# Patient Record
Sex: FEMALE | Race: WHITE | NOT HISPANIC OR LATINO | URBAN - METROPOLITAN AREA
[De-identification: names, ages, dates, MRNs, and addresses within clinical notes are randomized per-mention and may not be internally consistent; named-entity substitution may affect disease eponyms.]

---

## 2017-05-30 ENCOUNTER — EMERGENCY (EMERGENCY)
Facility: HOSPITAL | Age: 47
LOS: 0 days | Discharge: HOME | End: 2017-05-30
Attending: EMERGENCY MEDICINE
Payer: COMMERCIAL

## 2017-05-30 VITALS
HEIGHT: 66 IN | WEIGHT: 169.98 LBS | DIASTOLIC BLOOD PRESSURE: 101 MMHG | HEART RATE: 86 BPM | RESPIRATION RATE: 17 BRPM | TEMPERATURE: 99 F | OXYGEN SATURATION: 99 % | SYSTOLIC BLOOD PRESSURE: 142 MMHG

## 2017-05-30 VITALS
HEART RATE: 78 BPM | OXYGEN SATURATION: 100 % | DIASTOLIC BLOOD PRESSURE: 89 MMHG | TEMPERATURE: 99 F | SYSTOLIC BLOOD PRESSURE: 140 MMHG | RESPIRATION RATE: 17 BRPM

## 2017-05-30 LAB
APPEARANCE UR: CLEAR — SIGNIFICANT CHANGE UP
BILIRUB UR-MCNC: NEGATIVE — SIGNIFICANT CHANGE UP
COLOR SPEC: YELLOW — SIGNIFICANT CHANGE UP
DIFF PNL FLD: NEGATIVE — SIGNIFICANT CHANGE UP
GLUCOSE UR QL: NEGATIVE MG/DL — SIGNIFICANT CHANGE UP
HCG UR QL: NEGATIVE — SIGNIFICANT CHANGE UP
KETONES UR-MCNC: NEGATIVE — SIGNIFICANT CHANGE UP
LEUKOCYTE ESTERASE UR-ACNC: NEGATIVE — SIGNIFICANT CHANGE UP
NITRITE UR-MCNC: NEGATIVE — SIGNIFICANT CHANGE UP
PH UR: 7 — SIGNIFICANT CHANGE UP (ref 5–8)
PROT UR-MCNC: NEGATIVE MG/DL — SIGNIFICANT CHANGE UP
SP GR SPEC: 1.01 — SIGNIFICANT CHANGE UP (ref 1.01–1.02)
UROBILINOGEN FLD QL: NEGATIVE MG/DL — SIGNIFICANT CHANGE UP

## 2017-05-30 PROCEDURE — 99283 EMERGENCY DEPT VISIT LOW MDM: CPT

## 2017-05-30 PROCEDURE — 73562 X-RAY EXAM OF KNEE 3: CPT | Mod: 26,LT

## 2017-05-30 NOTE — ED ADULT NURSE NOTE - OBJECTIVE STATEMENT
patient received, alert and oriented x4, complaining of left knee pain, stated she slipped and fell yesterday in the bathroom, denies hitting head. denies passing out. denies n/v/d.

## 2017-05-30 NOTE — ED PROVIDER NOTE - CONSTITUTIONAL, MLM
normal... Well appearing, well nourished, awake, alert, oriented to person, place, time/situation and in no apparent distress. Speaking in clear full sentences appears comfortable sitting up with her left knee extended in the stretcher

## 2017-05-30 NOTE — ED PROVIDER NOTE - OBJECTIVE STATEMENT
47 years old female walked in with her  c/o pain to left knee after she slipped and twisted it yesterday and sts pain increases to flex the left knee. Pt denies loc, headache, neck/back pain, focal/distal weakness or numbness.

## 2017-05-30 NOTE — ED PROCEDURE NOTE - CPROC ED POST PROC CARE GUIDE1
Instructed patient/caregiver to follow-up with primary care physician./Instructed patient/caregiver regarding signs and symptoms of infection./Keep the cast/splint/dressing clean and dry./Elevate the injured extremity as instructed./Verbal/written post procedure instructions were given to patient/caregiver.

## 2017-05-30 NOTE — ED PROCEDURE NOTE - NS ED PERI VASCULAR NEG
no swelling/no paresthesia/no cyanosis of extremity/fingers/toes warm to touch/capillary refill time < 2 seconds
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
## 2017-05-31 DIAGNOSIS — S83.92XA SPRAIN OF UNSPECIFIED SITE OF LEFT KNEE, INITIAL ENCOUNTER: ICD-10-CM

## 2017-05-31 DIAGNOSIS — M25.562 PAIN IN LEFT KNEE: ICD-10-CM

## 2017-05-31 LAB
CULTURE RESULTS: SIGNIFICANT CHANGE UP
SPECIMEN SOURCE: SIGNIFICANT CHANGE UP

## 2017-12-11 NOTE — ED PROVIDER NOTE - PEDAL EDEMA LATERALITY
Rob Torres-  Would you please call this mom and set up a visit for her to come talk with me? She wants to go over some outside reports.  Thanks, Dr HEARN none

## 2018-12-15 ENCOUNTER — TRANSCRIPTION ENCOUNTER (OUTPATIENT)
Age: 48
End: 2018-12-15

## 2020-07-05 ENCOUNTER — TRANSCRIPTION ENCOUNTER (OUTPATIENT)
Age: 50
End: 2020-07-05

## 2022-01-03 ENCOUNTER — TRANSCRIPTION ENCOUNTER (OUTPATIENT)
Age: 52
End: 2022-01-03

## 2022-01-08 ENCOUNTER — TRANSCRIPTION ENCOUNTER (OUTPATIENT)
Age: 52
End: 2022-01-08

## 2023-01-06 ENCOUNTER — NON-APPOINTMENT (OUTPATIENT)
Age: 53
End: 2023-01-06

## 2023-01-06 DIAGNOSIS — Z87.19 PERSONAL HISTORY OF OTHER DISEASES OF THE DIGESTIVE SYSTEM: ICD-10-CM

## 2023-01-06 DIAGNOSIS — Z78.9 OTHER SPECIFIED HEALTH STATUS: ICD-10-CM

## 2023-01-06 DIAGNOSIS — G89.4 CHRONIC PAIN SYNDROME: ICD-10-CM

## 2023-01-06 DIAGNOSIS — Z87.39 PERSONAL HISTORY OF OTHER DISEASES OF THE MUSCULOSKELETAL SYSTEM AND CONNECTIVE TISSUE: ICD-10-CM

## 2023-01-06 DIAGNOSIS — M47.817 SPONDYLOSIS W/OUT MYELOPATHY OR RADICULOPATHY, LUMBOSACRAL REGION: ICD-10-CM

## 2023-01-06 DIAGNOSIS — M25.569 PAIN IN UNSPECIFIED KNEE: ICD-10-CM

## 2023-01-06 DIAGNOSIS — G89.29 OTHER CHRONIC PAIN: ICD-10-CM

## 2023-01-06 DIAGNOSIS — Z79.891 LONG TERM (CURRENT) USE OF OPIATE ANALGESIC: ICD-10-CM

## 2023-01-06 RX ORDER — LIDOCAINE 50 MG/G
5 PATCH CUTANEOUS
Refills: 0 | Status: ACTIVE | COMMUNITY

## 2023-01-06 RX ORDER — CHLORHEXIDINE GLUCONATE 4 %
325 (65 FE) LIQUID (ML) TOPICAL 3 TIMES DAILY
Refills: 0 | Status: ACTIVE | COMMUNITY

## 2023-01-06 RX ORDER — CYCLOBENZAPRINE HYDROCHLORIDE 10 MG/1
10 TABLET, FILM COATED ORAL 3 TIMES DAILY
Refills: 0 | Status: ACTIVE | COMMUNITY

## 2023-01-20 ENCOUNTER — APPOINTMENT (OUTPATIENT)
Dept: PAIN MANAGEMENT | Facility: CLINIC | Age: 53
End: 2023-01-20
Payer: COMMERCIAL

## 2023-01-20 VITALS — BODY MASS INDEX: 28.12 KG/M2 | HEIGHT: 66 IN | WEIGHT: 175 LBS

## 2023-01-20 PROCEDURE — 99213 OFFICE O/P EST LOW 20 MIN: CPT | Mod: 95

## 2023-01-20 NOTE — HISTORY OF PRESENT ILLNESS
[Lower back] : lower back [7] : 7 [Sharp] : sharp [Constant] : constant [Household chores] : household chores [Sleep] : sleep [Rest] : rest [Standing] : standing [Walking] : walking [Part time] : Work status: part time [Home] : at home, [unfilled] , at the time of the visit. [Medical Office: ([facility])___] : at the medical office located in  [Verbal consent obtained from patient] : the patient, [unfilled] [] : no [de-identified] : LONG PERIOD OF TIME

## 2023-02-09 ENCOUNTER — NON-APPOINTMENT (OUTPATIENT)
Age: 53
End: 2023-02-09

## 2023-02-15 ENCOUNTER — APPOINTMENT (OUTPATIENT)
Dept: PAIN MANAGEMENT | Facility: CLINIC | Age: 53
End: 2023-02-15
Payer: COMMERCIAL

## 2023-02-15 VITALS — WEIGHT: 175 LBS | HEIGHT: 66 IN | BODY MASS INDEX: 28.12 KG/M2

## 2023-02-15 VITALS — WEIGHT: 175 LBS | BODY MASS INDEX: 28.12 KG/M2 | HEIGHT: 66 IN

## 2023-02-15 VITALS — HEIGHT: 66 IN | WEIGHT: 175 LBS | BODY MASS INDEX: 28.12 KG/M2

## 2023-02-15 PROCEDURE — 99213 OFFICE O/P EST LOW 20 MIN: CPT | Mod: 95

## 2023-02-15 NOTE — HISTORY OF PRESENT ILLNESS
[Lower back] : lower back [7] : 7 [Sharp] : sharp [Shooting] : shooting [Constant] : constant [Sitting] : sitting [Standing] : standing [Bending forward] : bending forward [Full time] : Work status: full time [Home] : at home, [unfilled] , at the time of the visit. [Medical Office: ([facility])___] : at the medical office located in  [Verbal consent obtained from patient] : the patient, [unfilled] [] : Post Surgical Visit: no [de-identified] : DAILY AVITIVTY

## 2023-02-15 NOTE — ASSESSMENT
[FreeTextEntry1] : Interim history\par The patient is tolerating their medications without problems. There has no new pains, injuries, or complaints and no new issues. The use of medications appears appropriate and there are no aberrant behaviors noted. Side effects to current medications are denied. Average pain score for the month is 4 out of ten. The patient's current medications are documented to the best of their ability. THe  was obtained and reviewed prior to the visit, and any discrepancies were discussed with the patient.\par Objective information\par Since the last visit there are no additional radiologic studies, labs, or pain complaints. There are no changes in the patient's physical status.\par Plan\par The patient was given refill of their medication at their current level and will return to the office as needed for follow-up. The patient is showing no aberrant behavior or evidence of diversion. Opioid contract and opioid risk assessment on chart.  reviewed and any discrepancy discussed with patent. Applicable urine toxicology reviewed and recorded in the patient's electronic record. Urine toxicology is ordered for patient per office protocol or patient's risk assessment.  Patient will follow-up in 1 month unless new issues arise the patient has returned earlier.\par

## 2023-03-13 ENCOUNTER — APPOINTMENT (OUTPATIENT)
Dept: PAIN MANAGEMENT | Facility: CLINIC | Age: 53
End: 2023-03-13
Payer: COMMERCIAL

## 2023-03-13 PROCEDURE — 99213 OFFICE O/P EST LOW 20 MIN: CPT | Mod: 95

## 2023-03-13 NOTE — HISTORY OF PRESENT ILLNESS
[Lower back] : lower back [Sharp] : sharp [Shooting] : shooting [Constant] : constant [Standing] : standing [Sitting] : sitting [Bending forward] : bending forward [Full time] : Work status: full time [Home] : at home, [unfilled] , at the time of the visit. [Medical Office: ([facility])___] : at the medical office located in  [Verbal consent obtained from patient] : the patient, [unfilled] [6] : 6 [de-identified] : 03/13/2023- PATIENT STATES COMPLETED PHYSICAL THERAPY FOR 3X A WEEK AND REPORT'S IMPROVEMENT WITH PAIN.\par PT STATES HAS NOT COMPLETEDHOME STRETCHING PROGRAM DUE TO WORK  [de-identified] : DAILY AVITIVTY  [] : Post Surgical Visit: no

## 2023-04-10 ENCOUNTER — APPOINTMENT (OUTPATIENT)
Dept: PAIN MANAGEMENT | Facility: CLINIC | Age: 53
End: 2023-04-10
Payer: COMMERCIAL

## 2023-04-10 PROCEDURE — 99213 OFFICE O/P EST LOW 20 MIN: CPT | Mod: 95

## 2023-04-10 NOTE — HISTORY OF PRESENT ILLNESS
[Lower back] : lower back [6] : 6 [Sharp] : sharp [Shooting] : shooting [Constant] : constant [Sitting] : sitting [Standing] : standing [Bending forward] : bending forward [Full time] : Work status: full time [Home] : at home, [unfilled] , at the time of the visit. [Medical Office: ([facility])___] : at the medical office located in  [Verbal consent obtained from patient] : the patient, [unfilled] [de-identified] : 03/13/2023- PATIENT STATES COMPLETED PHYSICAL THERAPY FOR 3X A WEEK AND REPORT'S IMPROVEMENT WITH PAIN.\par PT STATES HAS NOT COMPLETE HOME STRETCHING PROGRAM DUE TO WORK  [] : Post Surgical Visit: no [de-identified] : DAILY AVITIVTY

## 2023-05-08 ENCOUNTER — APPOINTMENT (OUTPATIENT)
Dept: PAIN MANAGEMENT | Facility: CLINIC | Age: 53
End: 2023-05-08
Payer: COMMERCIAL

## 2023-05-08 PROCEDURE — 99213 OFFICE O/P EST LOW 20 MIN: CPT | Mod: 95

## 2023-05-08 RX ORDER — IBUPROFEN AND FAMOTIDINE 26.6; 8 MG/1; MG/1
800-26.6 TABLET, COATED ORAL
Qty: 30 | Refills: 0 | Status: ACTIVE | COMMUNITY

## 2023-05-08 NOTE — HISTORY OF PRESENT ILLNESS
[Lower back] : lower back [6] : 6 [Sharp] : sharp [Shooting] : shooting [Constant] : constant [Sitting] : sitting [Standing] : standing [Bending forward] : bending forward [Full time] : Work status: full time [Home] : at home, [unfilled] , at the time of the visit. [Medical Office: ([facility])___] : at the medical office located in  [Verbal consent obtained from patient] : the patient, [unfilled] [] : Post Surgical Visit: no [de-identified] : DAILY ACTIVITY  [de-identified] : 03/13/2023- PATIENT STATES COMPLETED PHYSICAL THERAPY FOR 3X A WEEK AND REPORT'S IMPROVEMENT WITH PAIN.\par PT STATES HAS NOT COMPLETE HOME STRETCHING PROGRAM DUE TO WORK

## 2023-06-05 ENCOUNTER — APPOINTMENT (OUTPATIENT)
Dept: PAIN MANAGEMENT | Facility: CLINIC | Age: 53
End: 2023-06-05
Payer: COMMERCIAL

## 2023-06-05 PROCEDURE — 99213 OFFICE O/P EST LOW 20 MIN: CPT | Mod: 95

## 2023-06-05 NOTE — HISTORY OF PRESENT ILLNESS
[Lower back] : lower back [6] : 6 [Sharp] : sharp [Shooting] : shooting [Constant] : constant [Sitting] : sitting [Standing] : standing [Bending forward] : bending forward [Full time] : Work status: full time [Home] : at home, [unfilled] , at the time of the visit. [Medical Office: ([facility])___] : at the medical office located in  [Verbal consent obtained from patient] : the patient, [unfilled] [] : Post Surgical Visit: no [de-identified] : DAILY ACTIVITY  [de-identified] : 03/13/2023- PATIENT STATES COMPLETED PHYSICAL THERAPY FOR 3X A WEEK AND REPORT'S IMPROVEMENT WITH PAIN.\par PT STATES HAS NOT COMPLETE HOME STRETCHING PROGRAM DUE TO WORK

## 2023-07-07 ENCOUNTER — APPOINTMENT (OUTPATIENT)
Dept: PAIN MANAGEMENT | Facility: CLINIC | Age: 53
End: 2023-07-07
Payer: COMMERCIAL

## 2023-07-07 PROCEDURE — 99213 OFFICE O/P EST LOW 20 MIN: CPT

## 2023-07-07 NOTE — HISTORY OF PRESENT ILLNESS
[Lower back] : lower back [6] : 6 [Sharp] : sharp [Shooting] : shooting [Constant] : constant [Sitting] : sitting [Standing] : standing [Bending forward] : bending forward [Full time] : Work status: full time [] : Post Surgical Visit: no [de-identified] : DAILY ACTIVITY

## 2023-08-02 ENCOUNTER — APPOINTMENT (OUTPATIENT)
Dept: PAIN MANAGEMENT | Facility: CLINIC | Age: 53
End: 2023-08-02
Payer: COMMERCIAL

## 2023-08-02 PROCEDURE — 99213 OFFICE O/P EST LOW 20 MIN: CPT | Mod: 95

## 2023-08-02 RX ORDER — IBUPROFEN AND FAMOTIDINE 26.6; 8 MG/1; MG/1
800-26.6 TABLET, COATED ORAL
Qty: 90 | Refills: 5 | Status: ACTIVE | COMMUNITY
Start: 2023-08-02 | End: 1900-01-01

## 2023-08-02 NOTE — HISTORY OF PRESENT ILLNESS
[Lower back] : lower back [6] : 6 [Sharp] : sharp [Shooting] : shooting [Constant] : constant [Sitting] : sitting [Standing] : standing [Bending forward] : bending forward [Full time] : Work status: full time [Home] : at home, [unfilled] , at the time of the visit. [Medical Office: ([facility])___] : at the medical office located in  [Verbal consent obtained from patient] : the patient, [unfilled] [] : Post Surgical Visit: no [de-identified] : DAILY ACTIVITY

## 2023-08-02 NOTE — ASSESSMENT
[FreeTextEntry1] : Interim history The patient is tolerating their medications without problems. There has no new pains, injuries, or complaints and no new issues. The use of medications appears appropriate and there are no aberrant behaviors noted. Side effects to current medications are denied. Average pain score for the month is 6 out of ten. The patient's current medications are documented to the best of their ability. THe  was obtained and reviewed prior to the visit, and any discrepancies were discussed with the patient. Objective information Since the last visit there are no additional radiologic studies, labs, or pain complaints. There are no changes in the patient's physical status. Plan The patient was given refill of their medication at their current level and will return to the office as needed for follow-up. The patient is showing no aberrant behavior or evidence of diversion. Opioid contract and opioid risk assessment on chart.  reviewed and any discrepancy discussed with patent. Applicable urine toxicology reviewed and recorded in the patient's electronic record. Urine toxicology is ordered for patient per office protocol or patient's risk assessment.  Patient will follow-up in 1 month unless new issues arise the patient has returned earlier. bilateral knee replacements

## 2023-09-01 ENCOUNTER — APPOINTMENT (OUTPATIENT)
Dept: PAIN MANAGEMENT | Facility: CLINIC | Age: 53
End: 2023-09-01
Payer: COMMERCIAL

## 2023-09-01 PROCEDURE — 99442: CPT

## 2023-09-01 NOTE — ASSESSMENT
[FreeTextEntry1] : Interim history The patient is tolerating their medications without problems. There has no new pains, injuries, or complaints and no new issues. The use of medications appears appropriate and there are no aberrant behaviors noted. Side effects to current medications are denied. Average pain score for the month is  5 out of ten. The patient's current medications are documented to the best of their ability. THe  was obtained and reviewed prior to the visit, and any discrepancies were discussed with the patient. Objective information Since the last visit there are no additional radiologic studies, labs, or pain complaints. There are no changes in the patient's physical status. Plan The patient was given refill of their medication at their current level and will return to the office as needed for follow-up. The patient is showing no aberrant behavior or evidence of diversion. Opioid contract and opioid risk assessment on chart.  reviewed and any discrepancy discussed with patent. Applicable urine toxicology reviewed and recorded in the patient's electronic record. Urine toxicology is ordered for patient per office protocol or patient's risk assessment.  Patient will follow-up in 1 month unless new issues arise the patient has returned earlier.
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
## 2023-09-25 ENCOUNTER — APPOINTMENT (OUTPATIENT)
Dept: PAIN MANAGEMENT | Facility: CLINIC | Age: 53
End: 2023-09-25
Payer: COMMERCIAL

## 2023-09-25 PROCEDURE — 99213 OFFICE O/P EST LOW 20 MIN: CPT | Mod: 95

## 2023-09-25 RX ORDER — DICLOFENAC SODIUM 16.05 MG/ML
1.5 SOLUTION TOPICAL
Qty: 1 | Refills: 0 | Status: ACTIVE | COMMUNITY
Start: 2023-09-25 | End: 1900-01-01

## 2023-09-28 ENCOUNTER — RX RENEWAL (OUTPATIENT)
Age: 53
End: 2023-09-28

## 2023-09-28 RX ORDER — LIDOCAINE 5% 700 MG/1
5 PATCH TOPICAL
Qty: 60 | Refills: 5 | Status: ACTIVE | COMMUNITY
Start: 2023-09-28 | End: 1900-01-01

## 2023-10-28 ENCOUNTER — NON-APPOINTMENT (OUTPATIENT)
Age: 53
End: 2023-10-28

## 2023-11-03 ENCOUNTER — APPOINTMENT (OUTPATIENT)
Dept: PAIN MANAGEMENT | Facility: CLINIC | Age: 53
End: 2023-11-03
Payer: COMMERCIAL

## 2023-11-03 VITALS — BODY MASS INDEX: 26.52 KG/M2 | HEIGHT: 66 IN | WEIGHT: 165 LBS

## 2023-11-03 PROCEDURE — 99213 OFFICE O/P EST LOW 20 MIN: CPT

## 2023-12-01 ENCOUNTER — APPOINTMENT (OUTPATIENT)
Dept: PAIN MANAGEMENT | Facility: CLINIC | Age: 53
End: 2023-12-01
Payer: COMMERCIAL

## 2023-12-01 PROCEDURE — 99213 OFFICE O/P EST LOW 20 MIN: CPT | Mod: 95

## 2024-01-05 ENCOUNTER — APPOINTMENT (OUTPATIENT)
Dept: PAIN MANAGEMENT | Facility: CLINIC | Age: 54
End: 2024-01-05
Payer: COMMERCIAL

## 2024-01-05 PROCEDURE — 99213 OFFICE O/P EST LOW 20 MIN: CPT | Mod: 95
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
## 2024-02-02 ENCOUNTER — APPOINTMENT (OUTPATIENT)
Dept: PAIN MANAGEMENT | Facility: CLINIC | Age: 54
End: 2024-02-02
Payer: COMMERCIAL

## 2024-02-02 PROCEDURE — 99213 OFFICE O/P EST LOW 20 MIN: CPT
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
## 2024-02-17 ENCOUNTER — APPOINTMENT (OUTPATIENT)
Dept: PAIN MANAGEMENT | Facility: CLINIC | Age: 54
End: 2024-02-17
Payer: COMMERCIAL

## 2024-02-17 PROCEDURE — 99213 OFFICE O/P EST LOW 20 MIN: CPT

## 2024-02-17 RX ORDER — NALOXONE HYDROCHLORIDE 4 MG/.1ML
4 SPRAY NASAL
Qty: 1 | Refills: 0 | Status: ACTIVE | COMMUNITY
Start: 2023-08-02 | End: 1900-01-01

## 2024-02-17 NOTE — HISTORY OF PRESENT ILLNESS
[Lower back] : lower back [Gradual] : gradual [7] : 7 [6] : 6 [Radiating] : radiating [Sharp] : sharp [Constant] : constant [Household chores] : household chores [Work] : work [Sleep] : sleep [Rest] : rest [Meds] : meds [Nothing helps with pain getting better] : Nothing helps with pain getting better [Sitting] : sitting [Standing] : standing [Walking] : walking [Bending forward] : bending forward [] : no

## 2024-03-03 ENCOUNTER — NON-APPOINTMENT (OUTPATIENT)
Age: 54
End: 2024-03-03

## 2024-03-15 ENCOUNTER — APPOINTMENT (OUTPATIENT)
Dept: PAIN MANAGEMENT | Facility: CLINIC | Age: 54
End: 2024-03-15
Payer: COMMERCIAL

## 2024-03-15 PROCEDURE — 99213 OFFICE O/P EST LOW 20 MIN: CPT
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
## 2024-04-26 ENCOUNTER — APPOINTMENT (OUTPATIENT)
Dept: PAIN MANAGEMENT | Facility: CLINIC | Age: 54
End: 2024-04-26
Payer: COMMERCIAL

## 2024-04-26 PROCEDURE — 99213 OFFICE O/P EST LOW 20 MIN: CPT

## 2024-04-26 NOTE — HISTORY OF PRESENT ILLNESS
[Lower back] : lower back [Left Leg] : left leg [Right Leg] : right leg [9] : 9 [7] : 7 [Stabbing] : stabbing [Frequent] : frequent [Work] : work [Sleep] : sleep [Meds] : meds [Standing] : standing [Full time] : Work status: full time [Home] : at home, [unfilled] , at the time of the visit. [Medical Office: ([facility])___] : at the medical office located in  [Verbal consent obtained from patient] : the patient, [unfilled] [] : no [FreeTextEntry1] : HIPS [de-identified] : 2019 [de-identified] : 2008

## 2024-04-27 ENCOUNTER — EMERGENCY (EMERGENCY)
Facility: HOSPITAL | Age: 54
LOS: 0 days | Discharge: HOME | End: 2024-04-27
Attending: STUDENT IN AN ORGANIZED HEALTH CARE EDUCATION/TRAINING PROGRAM
Payer: COMMERCIAL

## 2024-04-27 VITALS
TEMPERATURE: 99 F | HEIGHT: 65 IN | WEIGHT: 169.98 LBS | SYSTOLIC BLOOD PRESSURE: 138 MMHG | HEART RATE: 71 BPM | OXYGEN SATURATION: 99 % | DIASTOLIC BLOOD PRESSURE: 82 MMHG | RESPIRATION RATE: 20 BRPM

## 2024-04-27 VITALS
HEART RATE: 62 BPM | OXYGEN SATURATION: 98 % | RESPIRATION RATE: 18 BRPM | SYSTOLIC BLOOD PRESSURE: 146 MMHG | TEMPERATURE: 98 F | DIASTOLIC BLOOD PRESSURE: 90 MMHG

## 2024-04-27 DIAGNOSIS — M54.6 PAIN IN THORACIC SPINE: ICD-10-CM
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
## 2024-04-27 DIAGNOSIS — M54.9 DORSALGIA, UNSPECIFIED: ICD-10-CM

## 2024-04-27 DIAGNOSIS — M13.80 OTHER SPECIFIED ARTHRITIS, UNSPECIFIED SITE: ICD-10-CM
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
## 2024-04-27 DIAGNOSIS — S23.9XXA SPRAIN OF UNSPECIFIED PARTS OF THORAX, INITIAL ENCOUNTER: ICD-10-CM

## 2024-04-27 PROCEDURE — 72070 X-RAY EXAM THORAC SPINE 2VWS: CPT | Mod: 26

## 2024-04-27 PROCEDURE — 99284 EMERGENCY DEPT VISIT MOD MDM: CPT

## 2024-04-27 RX ORDER — METHOCARBAMOL 500 MG/1
2 TABLET, FILM COATED ORAL
Qty: 30 | Refills: 0
Start: 2024-04-27 | End: 2024-05-01
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
## 2024-04-27 NOTE — ED PROVIDER NOTE - PATIENT PORTAL LINK FT
You can access the FollowMyHealth Patient Portal offered by [facility] by registering at the following website: [email or web address]. By joining [facility]’s FollowMyHealth portal, you will also be able to view your health information using other applications (apps) compatible with our system.
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
## 2024-04-27 NOTE — ED ADULT NURSE NOTE - NSFALLUNIVINTERV_ED_ALL_ED
Bed/Stretcher in lowest position, wheels locked, appropriate side rails in place/Call bell, personal items and telephone in reach/Instruct patient to call for assistance before getting out of bed/chair/stretcher/Non-slip footwear applied when patient is off stretcher/[place] to call system/Physically safe environment - no spills, clutter or unnecessary equipment/Purposeful proactive rounding/Room/bathroom lighting operational, light cord in reach

## 2024-05-24 ENCOUNTER — APPOINTMENT (OUTPATIENT)
Dept: PAIN MANAGEMENT | Facility: CLINIC | Age: 54
End: 2024-05-24
Payer: COMMERCIAL

## 2024-05-24 DIAGNOSIS — M54.16 RADICULOPATHY, LUMBAR REGION: ICD-10-CM

## 2024-05-24 PROCEDURE — 99213 OFFICE O/P EST LOW 20 MIN: CPT

## 2024-05-24 NOTE — HISTORY OF PRESENT ILLNESS
[Lower back] : lower back [Left Leg] : left leg [Right Leg] : right leg [9] : 9 [7] : 7 [Stabbing] : stabbing [Frequent] : frequent [Work] : work [Sleep] : sleep [Meds] : meds [Standing] : standing [Full time] : Work status: full time [] : no [FreeTextEntry1] : HIPS [de-identified] : 2008 [de-identified] : 2019

## 2024-06-21 ENCOUNTER — APPOINTMENT (OUTPATIENT)
Dept: PAIN MANAGEMENT | Facility: CLINIC | Age: 54
End: 2024-06-21

## 2024-06-21 ENCOUNTER — APPOINTMENT (OUTPATIENT)
Dept: PAIN MANAGEMENT | Facility: CLINIC | Age: 54
End: 2024-06-21
Payer: COMMERCIAL

## 2024-06-21 DIAGNOSIS — M96.1 POSTLAMINECTOMY SYNDROME, NOT ELSEWHERE CLASSIFIED: ICD-10-CM

## 2024-06-21 PROCEDURE — 99213 OFFICE O/P EST LOW 20 MIN: CPT

## 2024-06-21 RX ORDER — OXYCODONE HYDROCHLORIDE 15 MG/1
15 TABLET ORAL EVERY 6 HOURS
Qty: 120 | Refills: 0 | Status: ACTIVE | COMMUNITY
Start: 2023-01-20 | End: 1900-01-01

## 2024-06-21 NOTE — REASON FOR VISIT
[Follow-Up Visit] : a follow-up pain management visit [Home] : at home, [unfilled] , at the time of the visit. [Medical Office: ([facility])___] : at the medical office located in  [Patient] : the patient [Self] : self

## 2024-06-21 NOTE — HISTORY OF PRESENT ILLNESS
[Lower back] : lower back [7] : 7 [5] : 5 [Dull/Aching] : dull/aching [Stabbing] : stabbing [Frequent] : frequent [Household chores] : household chores [Work] : work [Sleep] : sleep [Meds] : meds [Standing] : standing [Full time] : Work status: full time [] : no [FreeTextEntry1] : HIPS, FEET   [FreeTextEntry3] : N [de-identified] : 2008 [de-identified] : 2019 [de-identified] : WORKS ON HER FEET

## 2024-06-21 NOTE — DISCUSSION/SUMMARY
[Medication Risks Reviewed] : Medication risks reviewed [de-identified] : Prescriptions renewed. Opioid agreement/obtained on chart [place]  reviewed and appropriate. SOAPP-R completed on chart. The patient's medications are documented to the best of their ability. Quality of life and functional ability improved on medications. The patient is showing no aberrant behavior or evidence of diversion. The patient was advised not to use narcotic medication while operating an automobile or heavy machinery due to potential sedation or dizziness. The patient was educated to the risks associated with potential opioid dependence and addiction. Urine toxicology screens as per office protocol. Use of multimodal analgesia used prn. Follow up one month.

## 2024-07-22 ENCOUNTER — APPOINTMENT (OUTPATIENT)
Dept: PAIN MANAGEMENT | Facility: CLINIC | Age: 54
End: 2024-07-22
Payer: COMMERCIAL

## 2024-07-22 DIAGNOSIS — M54.16 RADICULOPATHY, LUMBAR REGION: ICD-10-CM

## 2024-07-22 PROCEDURE — 99213 OFFICE O/P EST LOW 20 MIN: CPT

## 2024-07-22 NOTE — HISTORY OF PRESENT ILLNESS
[Lower back] : lower back [Left Leg] : left leg [Right Leg] : right leg [9] : 9 [7] : 7 [Stabbing] : stabbing [Frequent] : frequent [Work] : work [Sleep] : sleep [Meds] : meds [Standing] : standing [Full time] : Work status: full time [Home] : at home, [unfilled] , at the time of the visit. [Medical Office: ([facility])___] : at the medical office located in  [Verbal consent obtained from patient] : the patient, [unfilled] [] : no [FreeTextEntry1] : HIPS [de-identified] : 2008 [de-identified] : 2019

## 2024-08-09 ENCOUNTER — APPOINTMENT (OUTPATIENT)
Dept: PAIN MANAGEMENT | Facility: CLINIC | Age: 54
End: 2024-08-09

## 2024-08-09 PROCEDURE — 99213 OFFICE O/P EST LOW 20 MIN: CPT

## 2024-08-09 NOTE — HISTORY OF PRESENT ILLNESS
[Lower back] : lower back [Left Leg] : left leg [Right Leg] : right leg [9] : 9 [7] : 7 [Stabbing] : stabbing [Frequent] : frequent [Work] : work [Sleep] : sleep [Meds] : meds [Standing] : standing [Full time] : Work status: full time [] : no [FreeTextEntry1] : HIPS [de-identified] : 2008 [de-identified] : 2019

## 2024-09-06 ENCOUNTER — APPOINTMENT (OUTPATIENT)
Dept: PAIN MANAGEMENT | Facility: CLINIC | Age: 54
End: 2024-09-06
Payer: COMMERCIAL

## 2024-09-06 DIAGNOSIS — M54.16 RADICULOPATHY, LUMBAR REGION: ICD-10-CM

## 2024-09-06 PROCEDURE — 99213 OFFICE O/P EST LOW 20 MIN: CPT

## 2024-09-06 NOTE — HISTORY OF PRESENT ILLNESS
[Lower back] : lower back [7] : 7 [5] : 5 [Dull/Aching] : dull/aching [Stabbing] : stabbing [Frequent] : frequent [Household chores] : household chores [Work] : work [Sleep] : sleep [Meds] : meds [Standing] : standing [Full time] : Work status: full time [Home] : at home, [unfilled] , at the time of the visit. [Medical Office: ([facility])___] : at the medical office located in  [Verbal consent obtained from patient] : the patient, [unfilled] [] : no [FreeTextEntry1] : HIPS, FEET   [FreeTextEntry3] : N [de-identified] : 2008 [de-identified] : 2019 [de-identified] : WORKS ON HER FEET

## 2024-09-19 ENCOUNTER — RX RENEWAL (OUTPATIENT)
Age: 54
End: 2024-09-19

## 2024-09-23 ENCOUNTER — APPOINTMENT (OUTPATIENT)
Dept: ORTHOPEDIC SURGERY | Facility: CLINIC | Age: 54
End: 2024-09-23

## 2024-09-23 DIAGNOSIS — M65.9 SYNOVITIS AND TENOSYNOVITIS, UNSPECIFIED: ICD-10-CM

## 2024-09-23 DIAGNOSIS — M25.562 PAIN IN LEFT KNEE: ICD-10-CM

## 2024-09-23 DIAGNOSIS — M17.12 UNILATERAL PRIMARY OSTEOARTHRITIS, LEFT KNEE: ICD-10-CM

## 2024-09-23 DIAGNOSIS — M25.462 EFFUSION, LEFT KNEE: ICD-10-CM

## 2024-09-23 PROCEDURE — 20611 DRAIN/INJ JOINT/BURSA W/US: CPT | Mod: LT

## 2024-09-23 PROCEDURE — J3490M: CUSTOM

## 2024-09-23 PROCEDURE — 73564 X-RAY EXAM KNEE 4 OR MORE: CPT | Mod: LT

## 2024-09-23 NOTE — PROCEDURE
[Large Joint Injection] : Large joint injection [Left] : of the left [Knee] : knee [Pain] : pain [Inflammation] : inflammation [X-ray evidence of Osteoarthritis on this or prior visit] : x-ray evidence of Osteoarthritis on this or prior visit [Alcohol] : alcohol [Betadine] : betadine [Ethyl Chloride sprayed topically] : ethyl chloride sprayed topically [Sterile technique used] : sterile technique used [___ cc    3mg] :  Betamethasone (Celestone) ~Vcc of 3mg [___ cc    1%] : Lidocaine ~Vcc of 1%  [___ cc    0.25%] : Bupivacaine (Marcaine) ~Vcc of 0.25%  [Effusion] : effusion [] : Patient tolerated procedure well [Call if redness, pain or fever occur] : call if redness, pain or fever occur [Apply ice for 15min out of every hour for the next 12-24 hours as tolerated] : apply ice for 15 minutes out of every hour for the next 12-24 hours as tolerated [Previous OTC use and PT nontherapeutic] : patient has tried OTC's including aspirin, Ibuprofen, Aleve, etc or prescription NSAIDS, and/or exercises at home and/or physical therapy without satisfactory response [Patient had decreased mobility in the joint] : patient had decreased mobility in the joint [Risks, benefits, alternatives discussed / Verbal consent obtained] : the risks benefits, and alternatives have been discussed, and verbal consent was obtained [Prior failure or difficult injection] : prior failure or difficult injection [All ultrasound images have been permanently captured and stored accordingly in our picture archiving and communication system] : All ultrasound images have been permanently captured and stored accordingly in our picture archiving and communication system [Visualization of the needle and placement of injection was performed without complication] : visualization of the needle and placement of injection was performed without complication [de-identified] : 145cc  [de-identified] : serous

## 2024-09-23 NOTE — HISTORY OF PRESENT ILLNESS
[de-identified] : Left knee pain that started in 2012. Had arthroscopic surgery. Is a . Pain has been getting progressively worse. Saw Stamatos PM; had MRI L Knee completed in 06/2023.

## 2024-09-23 NOTE — DISCUSSION/SUMMARY
[de-identified] : General Dx Discussion The patient was advised of the diagnosis. The natural history of the pathology was explained in full to the patient in layman's terms. All questions were answered. The risks and benefits of surgical and non-surgical treatment alternatives were explained in full to the patient.  will aspirate and inject lt knee  will get auth for gel one lt knee  f/u gel one auth  questions answered

## 2024-09-23 NOTE — IMAGING
[Left] : left knee [advanced tricompartmental OA with lateral compartment narrowing and valgus alignment] : advanced tricompartmental OA with lateral compartment narrowing and valgus alignment

## 2024-09-23 NOTE — PHYSICAL EXAM
[Left] : left knee [5___] : hamstring 5[unfilled]/5 [Negative] : negative anterior draw [] : no calf tenderness [TWNoteComboBox7] : flexion 95 degrees [de-identified] : extension 3 degrees

## 2024-10-04 ENCOUNTER — APPOINTMENT (OUTPATIENT)
Dept: PAIN MANAGEMENT | Facility: CLINIC | Age: 54
End: 2024-10-04
Payer: COMMERCIAL

## 2024-10-04 DIAGNOSIS — M54.16 RADICULOPATHY, LUMBAR REGION: ICD-10-CM

## 2024-10-04 PROCEDURE — 99213 OFFICE O/P EST LOW 20 MIN: CPT

## 2024-10-18 ENCOUNTER — APPOINTMENT (OUTPATIENT)
Dept: ORTHOPEDIC SURGERY | Facility: CLINIC | Age: 54
End: 2024-10-18

## 2024-10-18 VITALS — BODY MASS INDEX: 26.52 KG/M2 | WEIGHT: 165 LBS | HEIGHT: 66 IN

## 2024-10-18 DIAGNOSIS — M17.12 UNILATERAL PRIMARY OSTEOARTHRITIS, LEFT KNEE: ICD-10-CM

## 2024-10-18 PROCEDURE — 20611 DRAIN/INJ JOINT/BURSA W/US: CPT | Mod: LT

## 2024-11-07 ENCOUNTER — APPOINTMENT (OUTPATIENT)
Dept: ORTHOPEDIC SURGERY | Facility: CLINIC | Age: 54
End: 2024-11-07

## 2024-11-07 VITALS — WEIGHT: 175 LBS | HEIGHT: 66 IN | BODY MASS INDEX: 28.12 KG/M2

## 2024-11-07 DIAGNOSIS — M25.462 EFFUSION, LEFT KNEE: ICD-10-CM

## 2024-11-07 PROCEDURE — J3490M: CUSTOM

## 2024-11-07 PROCEDURE — 20611 DRAIN/INJ JOINT/BURSA W/US: CPT | Mod: LT

## 2024-11-08 ENCOUNTER — APPOINTMENT (OUTPATIENT)
Dept: PAIN MANAGEMENT | Facility: CLINIC | Age: 54
End: 2024-11-08
Payer: COMMERCIAL

## 2024-11-08 PROCEDURE — 99213 OFFICE O/P EST LOW 20 MIN: CPT

## 2024-11-15 ENCOUNTER — APPOINTMENT (OUTPATIENT)
Dept: ORTHOPEDIC SURGERY | Facility: CLINIC | Age: 54
End: 2024-11-15

## 2024-11-15 VITALS — HEIGHT: 66 IN | WEIGHT: 175 LBS | BODY MASS INDEX: 28.12 KG/M2

## 2024-11-15 DIAGNOSIS — M54.16 RADICULOPATHY, LUMBAR REGION: ICD-10-CM

## 2024-11-15 DIAGNOSIS — M17.12 UNILATERAL PRIMARY OSTEOARTHRITIS, LEFT KNEE: ICD-10-CM

## 2024-11-15 PROCEDURE — G2211 COMPLEX E/M VISIT ADD ON: CPT | Mod: NC

## 2024-11-15 PROCEDURE — 99205 OFFICE O/P NEW HI 60 MIN: CPT

## 2024-12-06 ENCOUNTER — APPOINTMENT (OUTPATIENT)
Dept: ORTHOPEDIC SURGERY | Facility: CLINIC | Age: 54
End: 2024-12-06

## 2024-12-09 ENCOUNTER — APPOINTMENT (OUTPATIENT)
Dept: PAIN MANAGEMENT | Facility: CLINIC | Age: 54
End: 2024-12-09
Payer: COMMERCIAL

## 2024-12-09 DIAGNOSIS — M96.1 POSTLAMINECTOMY SYNDROME, NOT ELSEWHERE CLASSIFIED: ICD-10-CM

## 2024-12-09 PROCEDURE — 99212 OFFICE O/P EST SF 10 MIN: CPT

## 2024-12-20 ENCOUNTER — APPOINTMENT (OUTPATIENT)
Dept: ORTHOPEDIC SURGERY | Facility: CLINIC | Age: 54
End: 2024-12-20
Payer: COMMERCIAL

## 2024-12-20 VITALS — WEIGHT: 167 LBS | BODY MASS INDEX: 26.84 KG/M2 | HEIGHT: 66 IN

## 2024-12-20 DIAGNOSIS — M17.12 UNILATERAL PRIMARY OSTEOARTHRITIS, LEFT KNEE: ICD-10-CM

## 2024-12-20 DIAGNOSIS — M71.22 SYNOVIAL CYST OF POPLITEAL SPACE [BAKER], LEFT KNEE: ICD-10-CM

## 2024-12-20 PROCEDURE — 73564 X-RAY EXAM KNEE 4 OR MORE: CPT | Mod: LT

## 2024-12-20 PROCEDURE — 99213 OFFICE O/P EST LOW 20 MIN: CPT

## 2024-12-20 PROCEDURE — G2211 COMPLEX E/M VISIT ADD ON: CPT | Mod: NC

## 2024-12-23 ENCOUNTER — RESULT REVIEW (OUTPATIENT)
Age: 54
End: 2024-12-23

## 2024-12-26 ENCOUNTER — APPOINTMENT (OUTPATIENT)
Dept: ULTRASOUND IMAGING | Facility: CLINIC | Age: 54
End: 2024-12-26
Payer: COMMERCIAL

## 2024-12-26 ENCOUNTER — OUTPATIENT (OUTPATIENT)
Dept: OUTPATIENT SERVICES | Facility: HOSPITAL | Age: 54
LOS: 1 days | End: 2024-12-26
Payer: COMMERCIAL

## 2024-12-26 DIAGNOSIS — M71.22 SYNOVIAL CYST OF POPLITEAL SPACE [BAKER], LEFT KNEE: ICD-10-CM

## 2024-12-26 DIAGNOSIS — M25.462 EFFUSION, LEFT KNEE: ICD-10-CM

## 2024-12-26 PROCEDURE — 93971 EXTREMITY STUDY: CPT | Mod: 26

## 2024-12-26 PROCEDURE — 93971 EXTREMITY STUDY: CPT

## 2025-01-02 ENCOUNTER — APPOINTMENT (OUTPATIENT)
Dept: ORTHOPEDIC SURGERY | Facility: CLINIC | Age: 55
End: 2025-01-02
Payer: COMMERCIAL

## 2025-01-02 VITALS — HEIGHT: 66 IN | BODY MASS INDEX: 26.84 KG/M2 | WEIGHT: 167 LBS

## 2025-01-02 DIAGNOSIS — M41.9 SCOLIOSIS, UNSPECIFIED: ICD-10-CM

## 2025-01-02 DIAGNOSIS — M50.30 OTHER CERVICAL DISC DEGENERATION, UNSPECIFIED CERVICAL REGION: ICD-10-CM

## 2025-01-02 DIAGNOSIS — M54.6 PAIN IN THORACIC SPINE: ICD-10-CM

## 2025-01-02 DIAGNOSIS — M54.2 CERVICALGIA: ICD-10-CM

## 2025-01-02 PROCEDURE — 99204 OFFICE O/P NEW MOD 45 MIN: CPT

## 2025-01-02 PROCEDURE — 72100 X-RAY EXAM L-S SPINE 2/3 VWS: CPT

## 2025-01-02 PROCEDURE — 72040 X-RAY EXAM NECK SPINE 2-3 VW: CPT

## 2025-01-06 ENCOUNTER — APPOINTMENT (OUTPATIENT)
Dept: PAIN MANAGEMENT | Facility: CLINIC | Age: 55
End: 2025-01-06
Payer: COMMERCIAL

## 2025-01-06 DIAGNOSIS — M54.16 RADICULOPATHY, LUMBAR REGION: ICD-10-CM

## 2025-01-06 PROCEDURE — 99213 OFFICE O/P EST LOW 20 MIN: CPT

## 2025-01-17 ENCOUNTER — APPOINTMENT (OUTPATIENT)
Dept: ORTHOPEDIC SURGERY | Facility: CLINIC | Age: 55
End: 2025-01-17
Payer: COMMERCIAL

## 2025-01-17 VITALS — HEIGHT: 66 IN | BODY MASS INDEX: 26.84 KG/M2 | WEIGHT: 167 LBS

## 2025-01-17 DIAGNOSIS — M25.552 PAIN IN RIGHT HIP: ICD-10-CM

## 2025-01-17 DIAGNOSIS — M25.562 PAIN IN LEFT KNEE: ICD-10-CM

## 2025-01-17 DIAGNOSIS — M25.551 PAIN IN RIGHT HIP: ICD-10-CM

## 2025-01-17 PROCEDURE — 72170 X-RAY EXAM OF PELVIS: CPT

## 2025-01-17 PROCEDURE — 99214 OFFICE O/P EST MOD 30 MIN: CPT

## 2025-01-22 PROBLEM — M25.551 HIP PAIN, BILATERAL: Status: ACTIVE | Noted: 2025-01-22

## 2025-01-28 ENCOUNTER — OUTPATIENT (OUTPATIENT)
Dept: OUTPATIENT SERVICES | Facility: HOSPITAL | Age: 55
LOS: 1 days | Discharge: HOME | End: 2025-01-28

## 2025-01-28 DIAGNOSIS — D64.9 ANEMIA, UNSPECIFIED: ICD-10-CM

## 2025-01-29 ENCOUNTER — RESULT REVIEW (OUTPATIENT)
Age: 55
End: 2025-01-29

## 2025-01-29 ENCOUNTER — APPOINTMENT (OUTPATIENT)
Dept: HEMATOLOGY ONCOLOGY | Facility: CLINIC | Age: 55
End: 2025-01-29
Payer: COMMERCIAL

## 2025-01-29 VITALS
HEART RATE: 62 BPM | DIASTOLIC BLOOD PRESSURE: 76 MMHG | SYSTOLIC BLOOD PRESSURE: 118 MMHG | RESPIRATION RATE: 15 BRPM | OXYGEN SATURATION: 96 % | WEIGHT: 171.96 LBS | TEMPERATURE: 98.1 F | BODY MASS INDEX: 28.31 KG/M2 | HEIGHT: 65.43 IN

## 2025-01-29 DIAGNOSIS — D64.9 ANEMIA, UNSPECIFIED: ICD-10-CM

## 2025-01-29 LAB
ANISOCYTOSIS BLD QL: SLIGHT — SIGNIFICANT CHANGE UP
BASOPHILS # BLD AUTO: 0.04 K/UL — SIGNIFICANT CHANGE UP (ref 0–0.2)
BASOPHILS NFR BLD AUTO: 0.7 % — SIGNIFICANT CHANGE UP (ref 0–2)
DACRYOCYTES BLD QL SMEAR: SLIGHT — SIGNIFICANT CHANGE UP
ELLIPTOCYTES BLD QL SMEAR: SLIGHT — SIGNIFICANT CHANGE UP
EOSINOPHIL # BLD AUTO: 0.18 K/UL — SIGNIFICANT CHANGE UP (ref 0–0.5)
EOSINOPHIL NFR BLD AUTO: 3 % — SIGNIFICANT CHANGE UP (ref 0–6)
HCT VFR BLD CALC: 32.7 % — LOW (ref 34.5–45)
HGB BLD-MCNC: 9.6 G/DL — LOW (ref 11.5–15.5)
IMM GRANULOCYTES NFR BLD AUTO: 0.7 % — SIGNIFICANT CHANGE UP (ref 0–0.9)
LYMPHOCYTES # BLD AUTO: 1.39 K/UL — SIGNIFICANT CHANGE UP (ref 1–3.3)
LYMPHOCYTES # BLD AUTO: 23.1 % — SIGNIFICANT CHANGE UP (ref 13–44)
MCHC RBC-ENTMCNC: 23.9 PG — LOW (ref 27–34)
MCHC RBC-ENTMCNC: 29.4 G/DL — LOW (ref 32–36)
MCV RBC AUTO: 81.3 FL — SIGNIFICANT CHANGE UP (ref 80–100)
MONOCYTES # BLD AUTO: 0.69 K/UL — SIGNIFICANT CHANGE UP (ref 0–0.9)
MONOCYTES NFR BLD AUTO: 11.5 % — SIGNIFICANT CHANGE UP (ref 2–14)
NEUTROPHILS # BLD AUTO: 3.68 K/UL — SIGNIFICANT CHANGE UP (ref 1.8–7.4)
NEUTROPHILS NFR BLD AUTO: 61 % — SIGNIFICANT CHANGE UP (ref 43–77)
NRBC # BLD: 0 /100 WBCS — SIGNIFICANT CHANGE UP (ref 0–0)
NRBC BLD-RTO: 0 /100 WBCS — SIGNIFICANT CHANGE UP (ref 0–0)
PLAT MORPH BLD: NORMAL — SIGNIFICANT CHANGE UP
PLATELET # BLD AUTO: 316 K/UL — SIGNIFICANT CHANGE UP (ref 150–400)
POIKILOCYTOSIS BLD QL AUTO: SLIGHT — SIGNIFICANT CHANGE UP
RBC # BLD: 4.02 M/UL — SIGNIFICANT CHANGE UP (ref 3.8–5.2)
RBC # FLD: 22.5 % — HIGH (ref 10.3–14.5)
RBC BLD AUTO: ABNORMAL
RETICS #: 62 K/UL — SIGNIFICANT CHANGE UP (ref 25–125)
RETICS/RBC NFR: 1.5 % — SIGNIFICANT CHANGE UP (ref 0.5–2.5)
WBC # BLD: 6.02 K/UL — SIGNIFICANT CHANGE UP (ref 3.8–10.5)
WBC # FLD AUTO: 6.02 K/UL — SIGNIFICANT CHANGE UP (ref 3.8–10.5)

## 2025-01-29 PROCEDURE — 99244 OFF/OP CNSLTJ NEW/EST MOD 40: CPT

## 2025-01-30 LAB
ALBUMIN SERPL ELPH-MCNC: 4 G/DL
ALP BLD-CCNC: 84 U/L
ALT SERPL-CCNC: 18 U/L
ANION GAP SERPL CALC-SCNC: 12 MMOL/L
AST SERPL-CCNC: 22 U/L
BILIRUB SERPL-MCNC: 0.2 MG/DL
BUN SERPL-MCNC: 11 MG/DL
CALCIUM SERPL-MCNC: 9.3 MG/DL
CHLORIDE SERPL-SCNC: 105 MMOL/L
CO2 SERPL-SCNC: 24 MMOL/L
CREAT SERPL-MCNC: 0.77 MG/DL
EGFR: 91 ML/MIN/1.73M2
FERRITIN SERPL-MCNC: 12 NG/ML
FOLATE SERPL-MCNC: 9.8 NG/ML
GLUCOSE SERPL-MCNC: 87 MG/DL
IRON SATN MFR SERPL: 7 %
IRON SERPL-MCNC: 26 UG/DL
POTASSIUM SERPL-SCNC: 4.8 MMOL/L
PROT SERPL-MCNC: 6.4 G/DL
SODIUM SERPL-SCNC: 141 MMOL/L
TIBC SERPL-MCNC: 350 UG/DL
UIBC SERPL-MCNC: 324 UG/DL
VIT B12 SERPL-MCNC: 367 PG/ML

## 2025-02-06 ENCOUNTER — APPOINTMENT (OUTPATIENT)
Dept: CARDIOLOGY | Facility: CLINIC | Age: 55
End: 2025-02-06
Payer: COMMERCIAL

## 2025-02-06 VITALS
BODY MASS INDEX: 27.92 KG/M2 | DIASTOLIC BLOOD PRESSURE: 81 MMHG | WEIGHT: 170 LBS | SYSTOLIC BLOOD PRESSURE: 148 MMHG | HEART RATE: 70 BPM | OXYGEN SATURATION: 100 %

## 2025-02-06 VITALS — DIASTOLIC BLOOD PRESSURE: 80 MMHG | SYSTOLIC BLOOD PRESSURE: 138 MMHG

## 2025-02-06 DIAGNOSIS — I82.492 ACUTE EMBOLISM AND THROMBOSIS OF OTHER SPECIFIED DEEP VEIN OF LEFT LOWER EXTREMITY: ICD-10-CM

## 2025-02-06 PROCEDURE — 99204 OFFICE O/P NEW MOD 45 MIN: CPT

## 2025-02-06 RX ORDER — RIVAROXABAN 20 MG/1
20 TABLET, FILM COATED ORAL
Qty: 90 | Refills: 3 | Status: ACTIVE | COMMUNITY
Start: 2025-02-06 | End: 1900-01-01

## 2025-02-07 ENCOUNTER — APPOINTMENT (OUTPATIENT)
Dept: ORTHOPEDIC SURGERY | Facility: CLINIC | Age: 55
End: 2025-02-07
Payer: COMMERCIAL

## 2025-02-07 VITALS — HEIGHT: 65 IN | BODY MASS INDEX: 27.49 KG/M2 | WEIGHT: 165 LBS

## 2025-02-07 DIAGNOSIS — M17.12 UNILATERAL PRIMARY OSTEOARTHRITIS, LEFT KNEE: ICD-10-CM

## 2025-02-07 PROCEDURE — 73564 X-RAY EXAM KNEE 4 OR MORE: CPT | Mod: LT

## 2025-02-07 PROCEDURE — 99213 OFFICE O/P EST LOW 20 MIN: CPT

## 2025-02-07 PROCEDURE — G2211 COMPLEX E/M VISIT ADD ON: CPT | Mod: NC

## 2025-02-13 ENCOUNTER — APPOINTMENT (OUTPATIENT)
Dept: PAIN MANAGEMENT | Facility: CLINIC | Age: 55
End: 2025-02-13
Payer: COMMERCIAL

## 2025-02-13 DIAGNOSIS — M54.16 RADICULOPATHY, LUMBAR REGION: ICD-10-CM

## 2025-02-13 PROCEDURE — 99213 OFFICE O/P EST LOW 20 MIN: CPT | Mod: 93

## 2025-02-21 ENCOUNTER — LABORATORY RESULT (OUTPATIENT)
Age: 55
End: 2025-02-21

## 2025-02-21 ENCOUNTER — APPOINTMENT (OUTPATIENT)
Dept: FAMILY MEDICINE | Facility: CLINIC | Age: 55
End: 2025-02-21
Payer: COMMERCIAL

## 2025-02-21 VITALS
HEART RATE: 93 BPM | TEMPERATURE: 98.8 F | OXYGEN SATURATION: 97 % | BODY MASS INDEX: 28.25 KG/M2 | SYSTOLIC BLOOD PRESSURE: 136 MMHG | WEIGHT: 169.56 LBS | DIASTOLIC BLOOD PRESSURE: 81 MMHG | RESPIRATION RATE: 18 BRPM | HEIGHT: 65 IN

## 2025-02-21 DIAGNOSIS — I10 ESSENTIAL (PRIMARY) HYPERTENSION: ICD-10-CM

## 2025-02-21 DIAGNOSIS — Z82.49 FAMILY HISTORY OF ISCHEMIC HEART DISEASE AND OTHER DISEASES OF THE CIRCULATORY SYSTEM: ICD-10-CM

## 2025-02-21 DIAGNOSIS — Z80.0 FAMILY HISTORY OF MALIGNANT NEOPLASM OF DIGESTIVE ORGANS: ICD-10-CM

## 2025-02-21 DIAGNOSIS — M79.602 PAIN IN LEFT ARM: ICD-10-CM

## 2025-02-21 DIAGNOSIS — R10.9 UNSPECIFIED ABDOMINAL PAIN: ICD-10-CM

## 2025-02-21 LAB
BILIRUB UR QL STRIP: NEGATIVE
CLARITY UR: CLEAR
COLLECTION METHOD: NORMAL
GLUCOSE UR-MCNC: NEGATIVE
HCG UR QL: 1 EU/DL
HGB UR QL STRIP.AUTO: NEGATIVE
KETONES UR-MCNC: NEGATIVE
LEUKOCYTE ESTERASE UR QL STRIP: NEGATIVE
NITRITE UR QL STRIP: NEGATIVE
PH UR STRIP: 6
PROT UR STRIP-MCNC: NORMAL
SP GR UR STRIP: 1.02

## 2025-02-21 PROCEDURE — 36415 COLL VENOUS BLD VENIPUNCTURE: CPT

## 2025-02-21 PROCEDURE — 99204 OFFICE O/P NEW MOD 45 MIN: CPT

## 2025-02-21 RX ORDER — METOPROLOL SUCCINATE 50 MG/1
50 TABLET, EXTENDED RELEASE ORAL DAILY
Qty: 90 | Refills: 0 | Status: ACTIVE | COMMUNITY
Start: 2025-02-21

## 2025-02-21 RX ORDER — DEXLANSOPRAZOLE 60 MG/1
60 CAPSULE, DELAYED RELEASE ORAL
Refills: 0 | Status: ACTIVE | COMMUNITY
Start: 2025-02-21

## 2025-02-22 DIAGNOSIS — R10.9 UNSPECIFIED ABDOMINAL PAIN: ICD-10-CM

## 2025-02-22 LAB
ALBUMIN SERPL ELPH-MCNC: 3.9 G/DL
ALP BLD-CCNC: 91 U/L
ALT SERPL-CCNC: 26 U/L
ANION GAP SERPL CALC-SCNC: 11 MMOL/L
AST SERPL-CCNC: 25 U/L
BASOPHILS # BLD AUTO: 0 K/UL
BASOPHILS NFR BLD AUTO: 0 %
BILIRUB SERPL-MCNC: 0.6 MG/DL
BUN SERPL-MCNC: 6 MG/DL
CALCIUM SERPL-MCNC: 9.1 MG/DL
CHLORIDE SERPL-SCNC: 100 MMOL/L
CHOLEST SERPL-MCNC: 178 MG/DL
CO2 SERPL-SCNC: 26 MMOL/L
CREAT SERPL-MCNC: 0.64 MG/DL
EGFR: 104 ML/MIN/1.73M2
EOSINOPHIL # BLD AUTO: 0 K/UL
EOSINOPHIL NFR BLD AUTO: 0 %
ESTIMATED AVERAGE GLUCOSE: 103 MG/DL
GLUCOSE SERPL-MCNC: 129 MG/DL
HBA1C MFR BLD HPLC: 5.2 %
HCT VFR BLD CALC: 36 %
HDLC SERPL-MCNC: 80 MG/DL
HGB BLD-MCNC: 10.8 G/DL
LDLC SERPL CALC-MCNC: 88 MG/DL
LYMPHOCYTES # BLD AUTO: 0.83 K/UL
LYMPHOCYTES NFR BLD AUTO: 6.1 %
MAN DIFF?: NORMAL
MCHC RBC-ENTMCNC: 26.2 PG
MCHC RBC-ENTMCNC: 30 G/DL
MCV RBC AUTO: 87.4 FL
MONOCYTES # BLD AUTO: 0.83 K/UL
MONOCYTES NFR BLD AUTO: 6.1 %
NEUTROPHILS # BLD AUTO: 11.73 K/UL
NEUTROPHILS NFR BLD AUTO: 86.1 %
NONHDLC SERPL-MCNC: 98 MG/DL
PLATELET # BLD AUTO: 258 K/UL
POTASSIUM SERPL-SCNC: 4.4 MMOL/L
PROT SERPL-MCNC: 6.4 G/DL
RBC # BLD: 4.12 M/UL
RBC # FLD: 24 %
SODIUM SERPL-SCNC: 136 MMOL/L
TRIGL SERPL-MCNC: 54 MG/DL
TSH SERPL-ACNC: 0.54 UIU/ML
WBC # FLD AUTO: 13.62 K/UL

## 2025-02-22 RX ORDER — AMOXICILLIN AND CLAVULANATE POTASSIUM 875; 125 MG/1; MG/1
875-125 TABLET, COATED ORAL
Qty: 20 | Refills: 0 | Status: ACTIVE | COMMUNITY
Start: 2025-02-22 | End: 1900-01-01

## 2025-02-23 LAB — BACTERIA UR CULT: NORMAL

## 2025-02-24 ENCOUNTER — INPATIENT (INPATIENT)
Facility: HOSPITAL | Age: 55
LOS: 2 days | Discharge: HOME | End: 2025-02-27
Attending: INTERNAL MEDICINE | Admitting: INTERNAL MEDICINE
Payer: COMMERCIAL

## 2025-02-24 VITALS
DIASTOLIC BLOOD PRESSURE: 86 MMHG | SYSTOLIC BLOOD PRESSURE: 151 MMHG | HEART RATE: 76 BPM | HEIGHT: 66 IN | OXYGEN SATURATION: 100 % | WEIGHT: 169.09 LBS | RESPIRATION RATE: 18 BRPM | TEMPERATURE: 98 F

## 2025-02-24 DIAGNOSIS — I26.99 OTHER PULMONARY EMBOLISM WITHOUT ACUTE COR PULMONALE: ICD-10-CM

## 2025-02-24 LAB
ALBUMIN SERPL ELPH-MCNC: 2.9 G/DL — LOW (ref 3.3–5)
ALP SERPL-CCNC: 91 U/L — SIGNIFICANT CHANGE UP (ref 40–120)
ALT FLD-CCNC: 29 U/L — SIGNIFICANT CHANGE UP (ref 12–78)
ANION GAP SERPL CALC-SCNC: 6 MMOL/L — SIGNIFICANT CHANGE UP (ref 5–17)
APPEARANCE UR: CLEAR — SIGNIFICANT CHANGE UP
APTT BLD: 35.6 SEC — SIGNIFICANT CHANGE UP (ref 24.5–35.6)
AST SERPL-CCNC: 20 U/L — SIGNIFICANT CHANGE UP (ref 15–37)
BASOPHILS # BLD AUTO: 0.02 K/UL — SIGNIFICANT CHANGE UP (ref 0–0.2)
BASOPHILS NFR BLD AUTO: 0.2 % — SIGNIFICANT CHANGE UP (ref 0–2)
BILIRUB SERPL-MCNC: 0.4 MG/DL — SIGNIFICANT CHANGE UP (ref 0.2–1.2)
BILIRUB UR-MCNC: NEGATIVE — SIGNIFICANT CHANGE UP
BUN SERPL-MCNC: 7 MG/DL — SIGNIFICANT CHANGE UP (ref 7–23)
CALCIUM SERPL-MCNC: 9.2 MG/DL — SIGNIFICANT CHANGE UP (ref 8.5–10.1)
CHLORIDE SERPL-SCNC: 108 MMOL/L — SIGNIFICANT CHANGE UP (ref 96–108)
CO2 SERPL-SCNC: 25 MMOL/L — SIGNIFICANT CHANGE UP (ref 22–31)
COLOR SPEC: YELLOW — SIGNIFICANT CHANGE UP
CREAT SERPL-MCNC: 0.57 MG/DL — SIGNIFICANT CHANGE UP (ref 0.5–1.3)
DIFF PNL FLD: NEGATIVE — SIGNIFICANT CHANGE UP
EGFR: 107 ML/MIN/1.73M2 — SIGNIFICANT CHANGE UP
EOSINOPHIL # BLD AUTO: 0.04 K/UL — SIGNIFICANT CHANGE UP (ref 0–0.5)
EOSINOPHIL NFR BLD AUTO: 0.5 % — SIGNIFICANT CHANGE UP (ref 0–6)
GLUCOSE SERPL-MCNC: 119 MG/DL — HIGH (ref 70–99)
GLUCOSE UR QL: NEGATIVE MG/DL — SIGNIFICANT CHANGE UP
HCT VFR BLD CALC: 34.2 % — LOW (ref 34.5–45)
HGB BLD-MCNC: 10.4 G/DL — LOW (ref 11.5–15.5)
IMM GRANULOCYTES NFR BLD AUTO: 0.9 % — SIGNIFICANT CHANGE UP (ref 0–0.9)
KETONES UR-MCNC: NEGATIVE MG/DL — SIGNIFICANT CHANGE UP
LEUKOCYTE ESTERASE UR-ACNC: ABNORMAL
LIDOCAIN IGE QN: 30 U/L — SIGNIFICANT CHANGE UP (ref 13–75)
LYMPHOCYTES # BLD AUTO: 0.73 K/UL — LOW (ref 1–3.3)
LYMPHOCYTES # BLD AUTO: 9 % — LOW (ref 13–44)
MCHC RBC-ENTMCNC: 26.2 PG — LOW (ref 27–34)
MCHC RBC-ENTMCNC: 30.4 G/DL — LOW (ref 32–36)
MCV RBC AUTO: 86.1 FL — SIGNIFICANT CHANGE UP (ref 80–100)
MONOCYTES # BLD AUTO: 0.85 K/UL — SIGNIFICANT CHANGE UP (ref 0–0.9)
MONOCYTES NFR BLD AUTO: 10.5 % — SIGNIFICANT CHANGE UP (ref 2–14)
NEUTROPHILS # BLD AUTO: 6.38 K/UL — SIGNIFICANT CHANGE UP (ref 1.8–7.4)
NEUTROPHILS NFR BLD AUTO: 78.9 % — HIGH (ref 43–77)
NITRITE UR-MCNC: NEGATIVE — SIGNIFICANT CHANGE UP
NRBC BLD AUTO-RTO: 0 /100 WBCS — SIGNIFICANT CHANGE UP (ref 0–0)
PH UR: 6.5 — SIGNIFICANT CHANGE UP (ref 5–8)
PLATELET # BLD AUTO: 275 K/UL — SIGNIFICANT CHANGE UP (ref 150–400)
POTASSIUM SERPL-MCNC: 4.3 MMOL/L — SIGNIFICANT CHANGE UP (ref 3.5–5.3)
POTASSIUM SERPL-SCNC: 4.3 MMOL/L — SIGNIFICANT CHANGE UP (ref 3.5–5.3)
PROT SERPL-MCNC: 7.3 GM/DL — SIGNIFICANT CHANGE UP (ref 6–8.3)
PROT UR-MCNC: NEGATIVE MG/DL — SIGNIFICANT CHANGE UP
RBC # BLD: 3.97 M/UL — SIGNIFICANT CHANGE UP (ref 3.8–5.2)
RBC # FLD: 21.7 % — HIGH (ref 10.3–14.5)
SODIUM SERPL-SCNC: 139 MMOL/L — SIGNIFICANT CHANGE UP (ref 135–145)
SP GR SPEC: 1.01 — SIGNIFICANT CHANGE UP (ref 1–1.03)
UROBILINOGEN FLD QL: 0.2 MG/DL — SIGNIFICANT CHANGE UP (ref 0.2–1)
WBC # BLD: 8.09 K/UL — SIGNIFICANT CHANGE UP (ref 3.8–10.5)
WBC # FLD AUTO: 8.09 K/UL — SIGNIFICANT CHANGE UP (ref 3.8–10.5)

## 2025-02-24 PROCEDURE — 99284 EMERGENCY DEPT VISIT MOD MDM: CPT

## 2025-02-24 PROCEDURE — 99222 1ST HOSP IP/OBS MODERATE 55: CPT

## 2025-02-24 PROCEDURE — 71275 CT ANGIOGRAPHY CHEST: CPT | Mod: 26

## 2025-02-24 PROCEDURE — 99285 EMERGENCY DEPT VISIT HI MDM: CPT

## 2025-02-24 PROCEDURE — 74177 CT ABD & PELVIS W/CONTRAST: CPT | Mod: 26

## 2025-02-24 RX ORDER — ONDANSETRON HCL/PF 4 MG/2 ML
4 VIAL (ML) INJECTION EVERY 8 HOURS
Refills: 0 | Status: DISCONTINUED | OUTPATIENT
Start: 2025-02-24 | End: 2025-02-27

## 2025-02-24 RX ORDER — PIPERACILLIN-TAZO-DEXTROSE,ISO 3.375G/5
3.38 IV SOLUTION, PIGGYBACK PREMIX FROZEN(ML) INTRAVENOUS EVERY 8 HOURS
Refills: 0 | Status: DISCONTINUED | OUTPATIENT
Start: 2025-02-24 | End: 2025-02-25

## 2025-02-24 RX ORDER — OXYCODONE HYDROCHLORIDE 30 MG/1
10 TABLET ORAL EVERY 4 HOURS
Refills: 0 | Status: DISCONTINUED | OUTPATIENT
Start: 2025-02-24 | End: 2025-02-27

## 2025-02-24 RX ORDER — OXYCODONE HYDROCHLORIDE 30 MG/1
5 TABLET ORAL EVERY 4 HOURS
Refills: 0 | Status: DISCONTINUED | OUTPATIENT
Start: 2025-02-24 | End: 2025-02-27

## 2025-02-24 RX ORDER — PIPERACILLIN-TAZO-DEXTROSE,ISO 3.375G/5
3.38 IV SOLUTION, PIGGYBACK PREMIX FROZEN(ML) INTRAVENOUS ONCE
Refills: 0 | Status: COMPLETED | OUTPATIENT
Start: 2025-02-24 | End: 2025-02-24

## 2025-02-24 RX ORDER — PIPERACILLIN-TAZO-DEXTROSE,ISO 3.375G/5
3.38 IV SOLUTION, PIGGYBACK PREMIX FROZEN(ML) INTRAVENOUS ONCE
Refills: 0 | Status: DISCONTINUED | OUTPATIENT
Start: 2025-02-24 | End: 2025-02-24

## 2025-02-24 RX ORDER — BISACODYL 5 MG
5 TABLET, DELAYED RELEASE (ENTERIC COATED) ORAL DAILY
Refills: 0 | Status: DISCONTINUED | OUTPATIENT
Start: 2025-02-24 | End: 2025-02-27

## 2025-02-24 RX ORDER — ACETAMINOPHEN 500 MG/5ML
650 LIQUID (ML) ORAL EVERY 6 HOURS
Refills: 0 | Status: DISCONTINUED | OUTPATIENT
Start: 2025-02-24 | End: 2025-02-27

## 2025-02-24 RX ORDER — MAGNESIUM, ALUMINUM HYDROXIDE 200-200 MG
30 TABLET,CHEWABLE ORAL EVERY 4 HOURS
Refills: 0 | Status: DISCONTINUED | OUTPATIENT
Start: 2025-02-24 | End: 2025-02-27

## 2025-02-24 RX ORDER — CIPROFLOXACIN HCL 250 MG
400 TABLET ORAL ONCE
Refills: 0 | Status: DISCONTINUED | OUTPATIENT
Start: 2025-02-24 | End: 2025-02-24

## 2025-02-24 RX ORDER — OXYCODONE HYDROCHLORIDE 30 MG/1
15 TABLET ORAL ONCE
Refills: 0 | Status: DISCONTINUED | OUTPATIENT
Start: 2025-02-24 | End: 2025-02-24

## 2025-02-24 RX ORDER — POLYETHYLENE GLYCOL 3350 17 G/17G
17 POWDER, FOR SOLUTION ORAL DAILY
Refills: 0 | Status: DISCONTINUED | OUTPATIENT
Start: 2025-02-24 | End: 2025-02-27

## 2025-02-24 RX ORDER — HEPARIN SODIUM 1000 [USP'U]/ML
6500 INJECTION INTRAVENOUS; SUBCUTANEOUS EVERY 6 HOURS
Refills: 0 | Status: DISCONTINUED | OUTPATIENT
Start: 2025-02-24 | End: 2025-02-27

## 2025-02-24 RX ORDER — METRONIDAZOLE 250 MG
500 TABLET ORAL ONCE
Refills: 0 | Status: DISCONTINUED | OUTPATIENT
Start: 2025-02-24 | End: 2025-02-24

## 2025-02-24 RX ORDER — MELATONIN 5 MG
3 TABLET ORAL AT BEDTIME
Refills: 0 | Status: DISCONTINUED | OUTPATIENT
Start: 2025-02-24 | End: 2025-02-27

## 2025-02-24 RX ORDER — HEPARIN SODIUM 1000 [USP'U]/ML
3000 INJECTION INTRAVENOUS; SUBCUTANEOUS EVERY 6 HOURS
Refills: 0 | Status: DISCONTINUED | OUTPATIENT
Start: 2025-02-24 | End: 2025-02-27

## 2025-02-24 RX ORDER — SENNA 187 MG
2 TABLET ORAL AT BEDTIME
Refills: 0 | Status: DISCONTINUED | OUTPATIENT
Start: 2025-02-24 | End: 2025-02-27

## 2025-02-24 RX ORDER — NALOXONE HYDROCHLORIDE 0.4 MG/ML
0.4 INJECTION, SOLUTION INTRAMUSCULAR; INTRAVENOUS; SUBCUTANEOUS ONCE
Refills: 0 | Status: DISCONTINUED | OUTPATIENT
Start: 2025-02-24 | End: 2025-02-27

## 2025-02-24 RX ORDER — HEPARIN SODIUM 1000 [USP'U]/ML
INJECTION INTRAVENOUS; SUBCUTANEOUS
Qty: 25000 | Refills: 0 | Status: DISCONTINUED | OUTPATIENT
Start: 2025-02-24 | End: 2025-02-26

## 2025-02-24 RX ADMIN — HEPARIN SODIUM 1400 UNIT(S)/HR: 1000 INJECTION INTRAVENOUS; SUBCUTANEOUS at 21:03

## 2025-02-24 RX ADMIN — Medication 25 GRAM(S): at 21:57

## 2025-02-24 RX ADMIN — OXYCODONE HYDROCHLORIDE 15 MILLIGRAM(S): 30 TABLET ORAL at 18:52

## 2025-02-24 RX ADMIN — Medication 1000 MILLILITER(S): at 14:01

## 2025-02-24 RX ADMIN — Medication 2 TABLET(S): at 21:56

## 2025-02-24 RX ADMIN — Medication 200 GRAM(S): at 18:52

## 2025-02-24 NOTE — ED PROVIDER NOTE - NS ED ROS FT
CONSTITUTIONAL: No weight loss, fever, chills, weakness or fatigue.    HEENT:    Eyes: No visual loss, blurred vision, double vision or yellow sclerae.  Ears, Nose, Throat: No hearing loss, sneezing, congestion, runny nose or sore throat.    CARDIOVASCULAR: No chest pain, chest pressure or chest discomfort. No palpitations or edema.    RESPIRATORY: No shortness of breath, cough or sputum.    GASTROINTESTINAL: Positive for abdominal pain and diarrhea.  No nausea or vomiting.    SKIN: No rash or itching.    GENITOURINARY: Patient denies any changes to bowel or bladder function.    NEUROLOGICAL: No headache, dizziness, syncope, paralysis, ataxia, numbness or tingling in the extremities. No change in bowel or bladder control.    MUSCULOSKELETAL: No muscle, back pain, joint pain or stiffness.

## 2025-02-24 NOTE — PATIENT PROFILE ADULT - PUBLIC BENEFITS
2135: Time of birth     65: VS and assessment completed. Infant bundled. FOB holding in OR.    2235: VS completed. Vitamin K + erythromycin given per order. Infant skin to skin. MOB  x 5 minutes (see flowsheet). 2305: VS completed - infant temp 97.3. Moved to RW; [address] set at 36.5. Blood glucose 44 with repeat 52.    2235: VS completed - infant temp 97.7. Remains on RW.    0000: VS completed - infant temp 98.9. Removed from RW. Infant bundled. FOB bottle fed infant 20 mL of Similac special care using slow flow nipple. 0245: Reassessment and VS. AC sugar = 56. Grandmother to feed infant. Infant took 5 mL of formula. 4479: AC sugar = 54. Grandmother fed infant 10 mL. Problem: Pain - [place]  Goal: Displays adequate comfort level or baseline comfort level  Outcome: Progressing     Problem:  Thermoregulation - /Pediatrics  Goal: Maintains normal body temperature  Outcome: Progressing  Flowsheets (Taken 2023 0000)  Maintains Normal Body Temperature:   Monitor temperature (axillary for Newborns) as ordered   Monitor for signs of hypothermia or hyperthermia     Problem: Safety - [place]  Goal: Free from fall injury  Outcome: Progressing     Problem: Normal [place]  Goal:  experiences normal transition  Outcome: Progressing  Flowsheets (Taken 2023)  Experiences Normal Transition:   Monitor vital signs   Maintain thermoregulation   Assess for hypoglycemia risk factors or signs and symptoms   Assess for sepsis risk factors or signs and symptoms   Assess for jaundice risk and/or signs and symptoms  Goal: Total Weight Loss Less than 10% of birth weight  Recent Flowsheet Documentation  Taken 2023 by Gilbert Mayfield RN  Total Weight Loss Less Than 10% of Birth Weight:   Assess feeding patterns   Weigh daily no

## 2025-02-24 NOTE — H&P ADULT - HISTORY OF PRESENT ILLNESS
This is a 54 year old F PMH HTN, DVT/PE on xarelto p/w abd pain with associated diarrhea. Patient was given Augmentin from PCP for possible diverticulitis but symptoms continued. Admits to fever. Denies N/V. States she recently had a colonoscopy about 3-4 weeks ago with Dr. Neely, and was told her results were normal. Of note, patient was diagnosed with DVT in Dec 2024 for which she was taking xarelto. She was not checked for PE at that time. 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 States she stopped smoking ever since. Denies undergoing any hypercoaguable workup and denies any blood disorders in her family.     In the ED, HDS. Labs revealed relatively unremarkable besides hgb 10.4. CT abd/pel w IV cont showed Acute complicated sigmoid diverticulitis with adjacent fluid and air collection. CTA chest was also done due to abnormality seen on CT AP, which was positive for PE. She was evaluated by surgical team who recommended IR consult. She was given IV abx in ED.

## 2025-02-24 NOTE — CONSULT NOTE ADULT - SUBJECTIVE AND OBJECTIVE BOX
Patient is a 54y old  Female who presents with a chief complaint of     HPI: 55 yo F pmhx of htn on metoprolol, PE/DVT 2019, and recently dx with LLE DVT on xarelto presenting to ed with generalized abd pain since 25. started with gen abd pain, fever of 102 and chills, went to PCP , flu was neg, and given Augmentin incase it was diverticulitis and advised to go to er if sxs worsen. + diarrhea over weekend. fever and chills resolved but abd pain persisted. pt denies cp,sob, n/v. pt recent colonoscopy with Dr Neely 4 weeks ago, known hx of diverticulosis. pshx left knee arthroscopy , and laminectomy       PAST MEDICAL & SURGICAL HISTORY:  Back pain  Hip Pain      S/P lumbar laminectomy      htn  diverticulosis         Review of Systems:  Negative except  as above in HPI    MEDICATIONS  (STANDING):  oxyCODONE    IR 15 milliGRAM(s) Oral Once  piperacillin/tazobactam IVPB. 3.375 Gram(s) IV Intermittent Once  piperacillin/tazobactam IVPB.. 3.375 Gram(s) IV Intermittent Once    MEDICATIONS  (PRN):      Allergies    No Known Allergies    Intolerances        SOCIAL HISTORY former smoker (quit in 2019), social etoh use          FAMILY HISTORY:  No pertinent family history in first degree relatives        Vital Signs Last 24 Hrs  T(C): 37 (2025 15:20), Max: 37 (2025 15:20)  T(F): 98.6 (2025 15:20), Max: 98.6 (2025 15:20)  HR: 81 (2025 15:20) (76 - 81)  BP: 131/79 (2025 15:20) (131/79 - 151/86)  BP(mean): --  RR: 18 (2025 15:20) (18 - 18)  SpO2: 99% (2025 15:20) (99% - 100%)    Parameters below as of 2025 15:20  Patient On (Oxygen Delivery Method): room air        Physical Exam:  General:  Appears stated age, well-groomed,  no distress  Eyes: EOMI, conjunctiva clear  Chest: no respiratory distress, no accessory muscle use  Abdomen:  soft, nondistended, nontender, no rebound or guarding   Neuro/Psych:  Alert, oriented to time, place and person     LABS:                        10.4   8.09  )-----------( 275      ( 2025 13:49 )             34.2         139  |  108  |  7   ----------------------------<  119[H]  4.3   |  25  |  0.57    Ca    9.2      2025 13:49    TPro  7.3  /  Alb  2.9[L]  /  TBili  0.4  /  DBili  x   /  AST  20  /  ALT  29  /  AlkPhos  91        Urinalysis Basic - ( 2025 16:16 )    Color: Yellow / Appearance: Clear / S.008 / pH: x  Gluc: x / Ketone: Negative mg/dL  / Bili: Negative / Urobili: 0.2 mg/dL   Blood: x / Protein: Negative mg/dL / Nitrite: Negative   Leuk Esterase: Trace / RBC: 0 /HPF / WBC 4 /HPF   Sq Epi: x / Non Sq Epi: x / Bacteria: Occasional /HPF          RADIOLOGY & ADDITIONAL STUDIES:  < from: CT Abdomen and Pelvis w/ IV Cont (25 @ 16:32) >  PROCEDURE:  CT of the Abdomen and Pelvis was performed.  Sagittal and coronal reformats were performed.    FINDINGS:  LOWER CHEST: Questionable filling defect in a right lower segmental   pulmonary artery branch (2-5), may represent pulmonary embolism. Right   cardiophrenic cystic lesion (2-16), 4.7 x 2.8 cm, may represent a   pericardial cyst.    LIVER: Within normal limits.  BILE DUCTS: Normal caliber.  GALLBLADDER: Cholelithiasis.  SPLEEN: Within normal limits.  PANCREAS: Within normal limits.  ADRENALS: Within normal limits.  KIDNEYS/URETERS: Within normal limits.    BLADDER: Within normal limits.  REPRODUCTIVE ORGANS: Uterine fibroids.    BOWEL: Small hiatal hernia.No bowel obstruction. Colonic diverticulosis.   Sigmoid diverticulosis with wall thickening and associated pericolic   inflammatory change, compatible with acute sigmoid diverticulitis.   Adjacent fluid and air collection (2-126), 3.1 x 2.2 cm. No associated   large free air. Appendix is normal.  PERITONEUM/RETROPERITONEUM: Within normal limits.  VESSELS: Atherosclerotic changes. Common hepatic artery arises directly   from the aorta, anatomic variant.  LYMPH NODES: No lymphadenopathy.  ABDOMINALWALL: Small fat-containing umbilical hernia.  BONES: Degenerative changes.    IMPRESSION:  Questionable filling defect in a right lower segmental pulmonary artery   branch, may represent pulmonary embolism. Recommend further evaluation   with dedicated CTA chest.    Acute complicated sigmoid diverticulitis with adjacent fluid and air   collection.    These findings were discussed with Dr. Karli Yu at 2025 4:53 PM   by Dr. Deal with read back.    < end of copied text >          A/P:  55 yo F pmhx of htn on metoprolol, PE/DVT 2019, and recently dx with LLE DVT on xarelto here with acute sigmoid diverticulitis with adjacent fluid collection, failed outpt PO augmentin. pt also with concern for possible PE   -f/u CTA chest  -NPO, IVFH  -IV zosyn  -analgesics prn  -IR consult for possible drainage   -case discussed with Dr Nevarez

## 2025-02-24 NOTE — ED PROVIDER NOTE - PROGRESS NOTE DETAILS
Yu DO: pt signed out to me pending ct a/p, had been on augmentin since friday for abd pain/diarrhea, came in for persistent abd pain, found to have complicated diverticulitis, but noted segmental PE, d/w surgery who recommended medicine admission w/ IR consult, stable. pt reports compliance w/ xarelto and took a dose today already so heparin not initiated, will need medicine eval to assess if any need for AC change though PE may have been there from prior dx of DVT, pt updated on plan of care and agreeable to admission

## 2025-02-24 NOTE — ED ADULT TRIAGE NOTE - CHIEF COMPLAINT QUOTE
Patient c/o lower abdominal pain with diarrhea since Friday. Has o/p appointment for CT scan tomorrow, cannot wait due to pain.   PMH: diverticulitis.

## 2025-02-24 NOTE — PHARMACOTHERAPY INTERVENTION NOTE - COMMENTS
As per policy for dose optimization on antimicrobials, adjusted second dose of pip/tazo to be administered 4 hours after initial 30 min loading dose given that patient has normal renal function.

## 2025-02-24 NOTE — ED ADULT NURSE NOTE - OBJECTIVE STATEMENT
Patient is a 54 years old female, alert & oriented x 4 complaining of abdominal pain & diarrhea x 3 days. Denies N&V, dysuria, fever, chest pain. No blood in stool. PRS 5/10. PMHX: Laminectomy, L knee surgery.

## 2025-02-24 NOTE — H&P ADULT - NSHPPHYSICALEXAM_GEN_ALL_CORE
GENERAL: NAD  HEAD:  Atraumatic   EYES: EOMI   ENMT: Moist mucous membranes  NECK: Supple   NERVOUS SYSTEM:  Awake  CHEST/LUNG: CTAB   HEART: RRR   ABDOMEN: Soft, Nontender, mildly distended with mild TTP  EXTREMITIES:   +edema   PSYCH: Mood appropriate

## 2025-02-24 NOTE — H&P ADULT - NSHPLABSRESULTS_GEN_ALL_CORE
10.4   8.09  )-----------( 275      ( 2025 13:49 )             34.2         139  |  108  |  7   ----------------------------<  119[H]  4.3   |  25  |  0.57    Ca    9.2      2025 13:49    TPro  7.3  /  Alb  2.9[L]  /  TBili  0.4  /  DBili  x   /  AST  20  /  ALT  29  /  AlkPhos  91        Urinalysis Basic - ( 2025 16:16 )    Color: Yellow / Appearance: Clear / S.008 / pH: x  Gluc: x / Ketone: Negative mg/dL  / Bili: Negative / Urobili: 0.2 mg/dL   Blood: x / Protein: Negative mg/dL / Nitrite: Negative   Leuk Esterase: Trace / RBC: 0 /HPF / WBC 4 /HPF   Sq Epi: x / Non Sq Epi: x / Bacteria: Occasional /HPF 10.4   8.09  )-----------( 275      ( 2025 13:49 )             34.2         139  |  108  |  7   ----------------------------<  119[H]  4.3   |  25  |  0.57    Ca    9.2      2025 13:49    TPro  7.3  /  Alb  2.9[L]  /  TBili  0.4  /  DBili  x   /  AST  20  /  ALT  29  /  AlkPhos  91        Urinalysis Basic - ( 2025 16:16 )    Color: Yellow / Appearance: Clear / S.008 / pH: x  Gluc: x / Ketone: Negative mg/dL  / Bili: Negative / Urobili: 0.2 mg/dL   Blood: x / Protein: Negative mg/dL / Nitrite: Negative   Leuk Esterase: Trace / RBC: 0 /HPF / WBC 4 /HPF   Sq Epi: x / Non Sq Epi: x / Bacteria: Occasional /HPF    < from: CT Abdomen and Pelvis w/ IV Cont (25 @ 16:32) >    IMPRESSION:  Questionable filling defect in a right lower segmental pulmonary artery   branch, may represent pulmonary embolism. Recommend further evaluation   with dedicated CTA chest.    Acute complicated sigmoid diverticulitis with adjacent fluid and air   collection.    < end of copied text >    < from: CT Angio Chest PE Protocol w/ IV Cont (25 @ 18:37) >    IMPRESSION:  Intraluminal filling defects in segmental/subsegmental branches of   pulmonary artery in right lower lobe, suggestive of pulmonary embolism.  3.0 x 3.0 pericardial cyst in right cardiophrenic angle.    < end of copied text >

## 2025-02-24 NOTE — ED PROVIDER NOTE - OBJECTIVE STATEMENT
This patient is a 54-year-old female presenting to the emergency department today for abdominal pain.  She has a past medical history of chronic back pain for which she takes oxycodone.  She has a past medical history of prior DVT requiring Xarelto.  She states that she has been experiencing abdominal pain and diarrhea for several days.  She saw her PCP who advised her to come to the emergency department    Patient states that she has been having continuous diarrhea daily since the meeting with her PCP.  She states that today she has been experiencing generalized abdominal pain that is worse.  For this reason she came to the emergency department for further evaluation.  No other complaints at this time.

## 2025-02-24 NOTE — ED PROVIDER NOTE - CARE PLAN
Principal Discharge DX:	Abdominal pain   1 Principal Discharge DX:	Acute diverticulitis of intestine  Secondary Diagnosis:	Acute pulmonary embolism

## 2025-02-24 NOTE — ED PROVIDER NOTE - CLINICAL SUMMARY MEDICAL DECISION MAKING FREE TEXT BOX
This patient is a 54-year-old female presenting to the emergency department today for abdominal pain.  CBC shows no leukocytosis.  No acute anemia.  No thrombocytopenia.  CMP shows no significant electrolyte abnormalities.  No RENÉ but elevated LFTs.    Patient was ordered a CT of the abdomen pelvis with IV contrast.  Also ordered urinalysis.  At this time, the patient's remaining imaging and lab work has not been completed.  For this reason, the patient will be signed out to the oncoming physician.  This does shelby the end of my shift.  I did offer pain medication for the patient, however, they declined at this time.  Please see the oncoming physician's addendum's, notes, and progress notes for any change in this patient's management or care.

## 2025-02-24 NOTE — H&P ADULT - ASSESSMENT
TBD This is a 54 year old F PMH HTN, DVT/PE on xarelto p/w abd pain with associated diarrhea found to have diverticulitis with fluid collection on imaging. Also with DVT and PE.     Sigmoid diverticulitis with fluid collection   Recent colonoscopy 3-4 weeks ago outpatient, reported normal   CT A/P w/ contrast with acute complicated sigmoid diverticulitis with adjacent fluid and air collection.  UA neg for infxn, blood cultures ordered   Started on IV abx, continue with zosyn   Evaluated by surgery who rec IR consultation   IR consult placed     PE/DVT   History of DVT and PE in 2019 which was treated  Again had DVT in dec 2024 for which she was started on xarelto  Unclear if she had PE at the time, however PE seen on CTA on admission here  Cannot determine if this is old vs new/if failed xarelto or not  Echo to evaluate for right heart strain   Start heparin gtt at this time for AC   Will likely need hypercoagulable workup as well as r/o malignancy   Will consult pulm in AM     HTN  Home med metoprolol succ 50mg QD    Anemia   States h/o iron def anemia, was on iron pills, no longer taking   Monitor CBC

## 2025-02-25 ENCOUNTER — APPOINTMENT (OUTPATIENT)
Dept: CT IMAGING | Facility: CLINIC | Age: 55
End: 2025-02-25

## 2025-02-25 ENCOUNTER — RESULT REVIEW (OUTPATIENT)
Age: 55
End: 2025-02-25

## 2025-02-25 ENCOUNTER — APPOINTMENT (OUTPATIENT)
Dept: ORTHOPEDIC SURGERY | Facility: HOSPITAL | Age: 55
End: 2025-02-25

## 2025-02-25 LAB
ALBUMIN SERPL ELPH-MCNC: 2.4 G/DL — LOW (ref 3.3–5)
ALP SERPL-CCNC: 74 U/L — SIGNIFICANT CHANGE UP (ref 40–120)
ALT FLD-CCNC: 20 U/L — SIGNIFICANT CHANGE UP (ref 12–78)
ANION GAP SERPL CALC-SCNC: 5 MMOL/L — SIGNIFICANT CHANGE UP (ref 5–17)
APTT BLD: 44.8 SEC — HIGH (ref 24.5–35.6)
APTT BLD: 58.6 SEC — HIGH (ref 24.5–35.6)
APTT BLD: 68.8 SEC — HIGH (ref 24.5–35.6)
AST SERPL-CCNC: 10 U/L — LOW (ref 15–37)
BASOPHILS # BLD AUTO: 0.03 K/UL — SIGNIFICANT CHANGE UP (ref 0–0.2)
BASOPHILS NFR BLD AUTO: 0.4 % — SIGNIFICANT CHANGE UP (ref 0–2)
BILIRUB SERPL-MCNC: 0.4 MG/DL — SIGNIFICANT CHANGE UP (ref 0.2–1.2)
BUN SERPL-MCNC: 4 MG/DL — LOW (ref 7–23)
CALCIUM SERPL-MCNC: 8.6 MG/DL — SIGNIFICANT CHANGE UP (ref 8.5–10.1)
CHLORIDE SERPL-SCNC: 109 MMOL/L — HIGH (ref 96–108)
CO2 SERPL-SCNC: 25 MMOL/L — SIGNIFICANT CHANGE UP (ref 22–31)
CREAT SERPL-MCNC: 0.54 MG/DL — SIGNIFICANT CHANGE UP (ref 0.5–1.3)
EGFR: 109 ML/MIN/1.73M2 — SIGNIFICANT CHANGE UP
EOSINOPHIL # BLD AUTO: 0.16 K/UL — SIGNIFICANT CHANGE UP (ref 0–0.5)
EOSINOPHIL NFR BLD AUTO: 2.1 % — SIGNIFICANT CHANGE UP (ref 0–6)
GLUCOSE SERPL-MCNC: 119 MG/DL — HIGH (ref 70–99)
HCT VFR BLD CALC: 31.9 % — LOW (ref 34.5–45)
HCT VFR BLD CALC: 33 % — LOW (ref 34.5–45)
HGB BLD-MCNC: 10.1 G/DL — LOW (ref 11.5–15.5)
HGB BLD-MCNC: 9.9 G/DL — LOW (ref 11.5–15.5)
IMM GRANULOCYTES NFR BLD AUTO: 0.8 % — SIGNIFICANT CHANGE UP (ref 0–0.9)
LYMPHOCYTES # BLD AUTO: 1.23 K/UL — SIGNIFICANT CHANGE UP (ref 1–3.3)
LYMPHOCYTES # BLD AUTO: 15.9 % — SIGNIFICANT CHANGE UP (ref 13–44)
MCHC RBC-ENTMCNC: 26.3 PG — LOW (ref 27–34)
MCHC RBC-ENTMCNC: 26.5 PG — LOW (ref 27–34)
MCHC RBC-ENTMCNC: 30.6 G/DL — LOW (ref 32–36)
MCHC RBC-ENTMCNC: 31 G/DL — LOW (ref 32–36)
MCV RBC AUTO: 85.5 FL — SIGNIFICANT CHANGE UP (ref 80–100)
MCV RBC AUTO: 85.9 FL — SIGNIFICANT CHANGE UP (ref 80–100)
MONOCYTES # BLD AUTO: 0.79 K/UL — SIGNIFICANT CHANGE UP (ref 0–0.9)
MONOCYTES NFR BLD AUTO: 10.2 % — SIGNIFICANT CHANGE UP (ref 2–14)
NEUTROPHILS # BLD AUTO: 5.48 K/UL — SIGNIFICANT CHANGE UP (ref 1.8–7.4)
NEUTROPHILS NFR BLD AUTO: 70.6 % — SIGNIFICANT CHANGE UP (ref 43–77)
NRBC BLD AUTO-RTO: 0 /100 WBCS — SIGNIFICANT CHANGE UP (ref 0–0)
NRBC BLD AUTO-RTO: 0 /100 WBCS — SIGNIFICANT CHANGE UP (ref 0–0)
PLATELET # BLD AUTO: 256 K/UL — SIGNIFICANT CHANGE UP (ref 150–400)
PLATELET # BLD AUTO: 288 K/UL — SIGNIFICANT CHANGE UP (ref 150–400)
POTASSIUM SERPL-MCNC: 3.3 MMOL/L — LOW (ref 3.5–5.3)
POTASSIUM SERPL-SCNC: 3.3 MMOL/L — LOW (ref 3.5–5.3)
PROT SERPL-MCNC: 6.2 GM/DL — SIGNIFICANT CHANGE UP (ref 6–8.3)
RBC # BLD: 3.73 M/UL — LOW (ref 3.8–5.2)
RBC # BLD: 3.84 M/UL — SIGNIFICANT CHANGE UP (ref 3.8–5.2)
RBC # FLD: 21.3 % — HIGH (ref 10.3–14.5)
RBC # FLD: 21.5 % — HIGH (ref 10.3–14.5)
SODIUM SERPL-SCNC: 139 MMOL/L — SIGNIFICANT CHANGE UP (ref 135–145)
WBC # BLD: 6.77 K/UL — SIGNIFICANT CHANGE UP (ref 3.8–10.5)
WBC # BLD: 7.75 K/UL — SIGNIFICANT CHANGE UP (ref 3.8–10.5)
WBC # FLD AUTO: 6.77 K/UL — SIGNIFICANT CHANGE UP (ref 3.8–10.5)
WBC # FLD AUTO: 7.75 K/UL — SIGNIFICANT CHANGE UP (ref 3.8–10.5)

## 2025-02-25 PROCEDURE — 99223 1ST HOSP IP/OBS HIGH 75: CPT

## 2025-02-25 PROCEDURE — 93306 TTE W/DOPPLER COMPLETE: CPT | Mod: 26

## 2025-02-25 PROCEDURE — 99222 1ST HOSP IP/OBS MODERATE 55: CPT

## 2025-02-25 PROCEDURE — 78582 LUNG VENTILAT&PERFUS IMAGING: CPT | Mod: 26

## 2025-02-25 PROCEDURE — 99233 SBSQ HOSP IP/OBS HIGH 50: CPT

## 2025-02-25 PROCEDURE — G0545: CPT

## 2025-02-25 PROCEDURE — 71045 X-RAY EXAM CHEST 1 VIEW: CPT | Mod: 26

## 2025-02-25 PROCEDURE — 93356 MYOCRD STRAIN IMG SPCKL TRCK: CPT

## 2025-02-25 RX ORDER — CEFEPIME 2 G/20ML
2000 INJECTION, POWDER, FOR SOLUTION INTRAVENOUS EVERY 8 HOURS
Refills: 0 | Status: DISCONTINUED | OUTPATIENT
Start: 2025-02-25 | End: 2025-02-27

## 2025-02-25 RX ORDER — OXYCODONE HYDROCHLORIDE 30 MG/1
5 TABLET ORAL ONCE
Refills: 0 | Status: DISCONTINUED | OUTPATIENT
Start: 2025-02-25 | End: 2025-02-25

## 2025-02-25 RX ORDER — METOPROLOL SUCCINATE 50 MG/1
50 TABLET, EXTENDED RELEASE ORAL DAILY
Refills: 0 | Status: DISCONTINUED | OUTPATIENT
Start: 2025-02-25 | End: 2025-02-27

## 2025-02-25 RX ORDER — METRONIDAZOLE 250 MG
500 TABLET ORAL EVERY 12 HOURS
Refills: 0 | Status: DISCONTINUED | OUTPATIENT
Start: 2025-02-25 | End: 2025-02-27

## 2025-02-25 RX ADMIN — Medication 500 MILLIGRAM(S): at 19:01

## 2025-02-25 RX ADMIN — OXYCODONE HYDROCHLORIDE 5 MILLIGRAM(S): 30 TABLET ORAL at 05:11

## 2025-02-25 RX ADMIN — Medication 20 MILLIGRAM(S): at 21:37

## 2025-02-25 RX ADMIN — OXYCODONE HYDROCHLORIDE 10 MILLIGRAM(S): 30 TABLET ORAL at 11:13

## 2025-02-25 RX ADMIN — CEFEPIME 100 MILLIGRAM(S): 2 INJECTION, POWDER, FOR SOLUTION INTRAVENOUS at 21:55

## 2025-02-25 RX ADMIN — OXYCODONE HYDROCHLORIDE 10 MILLIGRAM(S): 30 TABLET ORAL at 19:01

## 2025-02-25 RX ADMIN — Medication 25 GRAM(S): at 05:26

## 2025-02-25 RX ADMIN — HEPARIN SODIUM 1600 UNIT(S)/HR: 1000 INJECTION INTRAVENOUS; SUBCUTANEOUS at 04:21

## 2025-02-25 RX ADMIN — OXYCODONE HYDROCHLORIDE 5 MILLIGRAM(S): 30 TABLET ORAL at 11:07

## 2025-02-25 RX ADMIN — HEPARIN SODIUM 3000 UNIT(S): 1000 INJECTION INTRAVENOUS; SUBCUTANEOUS at 04:23

## 2025-02-25 RX ADMIN — OXYCODONE HYDROCHLORIDE 10 MILLIGRAM(S): 30 TABLET ORAL at 04:17

## 2025-02-25 RX ADMIN — OXYCODONE HYDROCHLORIDE 5 MILLIGRAM(S): 30 TABLET ORAL at 19:01

## 2025-02-25 RX ADMIN — CEFEPIME 100 MILLIGRAM(S): 2 INJECTION, POWDER, FOR SOLUTION INTRAVENOUS at 15:48

## 2025-02-25 RX ADMIN — HEPARIN SODIUM 1600 UNIT(S)/HR: 1000 INJECTION INTRAVENOUS; SUBCUTANEOUS at 20:21

## 2025-02-25 RX ADMIN — HEPARIN SODIUM 1600 UNIT(S)/HR: 1000 INJECTION INTRAVENOUS; SUBCUTANEOUS at 19:20

## 2025-02-25 RX ADMIN — OXYCODONE HYDROCHLORIDE 10 MILLIGRAM(S): 30 TABLET ORAL at 04:02

## 2025-02-25 RX ADMIN — OXYCODONE HYDROCHLORIDE 5 MILLIGRAM(S): 30 TABLET ORAL at 04:20

## 2025-02-25 RX ADMIN — HEPARIN SODIUM 1600 UNIT(S)/HR: 1000 INJECTION INTRAVENOUS; SUBCUTANEOUS at 11:23

## 2025-02-25 NOTE — PROGRESS NOTE ADULT - SUBJECTIVE AND OBJECTIVE BOX
Patient: BETTY NAVARRO [id number] 54y Female                            Hospitalist Attending Note    No complaints.  No chest pain / palpitations. No SOB.    No bleeding.  No leg pain.      ____________________PHYSICAL EXAM:  GENERAL:  NAD Alert and Oriented x 3   HEENT: NCAT  CARDIOVASCULAR:  S1, S2  LUNGS: CTAB  ABDOMEN:  soft, (-) tenderness, (-) distension, (+) bowel sounds, (-) guarding, (-) rebound (-) rigidity  EXTREMITIES:  no cyanosis / clubbing.  + LLE edema.   ____________________     VITALS:  Vital Signs Last 24 Hrs  T(C): 36.7 (25 Feb 2025 15:42), Max: 36.8 (25 Feb 2025 04:01)  T(F): 98 (25 Feb 2025 15:42), Max: 98.3 (25 Feb 2025 04:01)  HR: 87 (25 Feb 2025 15:42) (72 - 87)  BP: 131/84 (25 Feb 2025 15:42) (95/48 - 155/97)  BP(mean): --  RR: 16 (25 Feb 2025 15:42) (14 - 19)  SpO2: 100% (25 Feb 2025 15:42) (94% - 100%)    Parameters below as of 25 Feb 2025 15:42  Patient On (Oxygen Delivery Method): room air     Daily     Daily   CAPILLARY BLOOD GLUCOSE        I&O's Summary      HISTORY:  PAST MEDICAL & SURGICAL HISTORY:  Back pain  Hip Pain      S/P lumbar laminectomy  2008      Allergies    No Known Allergies    Intolerances       LABS:                        9.9    6.77  )-----------( 256      ( 25 Feb 2025 08:00 )             31.9       Urinalysis with Rflx Culture (collected 24 Feb 2025 16:16)    02-25    139  |  109[H]  |  4[L]  ----------------------------<  119[H]  3.3[L]   |  25  |  0.54    Ca    8.6      25 Feb 2025 08:00    TPro  6.2  /  Alb  2.4[L]  /  TBili  0.4  /  DBili  x   /  AST  10[L]  /  ALT  20  /  AlkPhos  74  02-25    PTT - ( 25 Feb 2025 09:00 )  PTT:68.8 sec  LIVER FUNCTIONS - ( 25 Feb 2025 08:00 )  Alb: 2.4 g/dL / Pro: 6.2 gm/dL / ALK PHOS: 74 U/L / ALT: 20 U/L / AST: 10 U/L / GGT: x           Urinalysis Basic - ( 25 Feb 2025 08:00 )    Color: x / Appearance: x / SG: x / pH: x  Gluc: 119 mg/dL / Ketone: x  / Bili: x / Urobili: x   Blood: x / Protein: x / Nitrite: x   Leuk Esterase: x / RBC: x / WBC x   Sq Epi: x / Non Sq Epi: x / Bacteria: x          Urinalysis with Rflx Culture (collected 24 Feb 2025 16:16)          MEDICATIONS:  MEDICATIONS  (STANDING):  cefepime   IVPB 2000 milliGRAM(s) IV Intermittent every 8 hours  heparin  Infusion.  Unit(s)/Hr (14 mL/Hr) IV Continuous <Continuous>  metroNIDAZOLE    Tablet 500 milliGRAM(s) Oral every 12 hours  naloxone Injectable 0.4 milliGRAM(s) IV Push once  polyethylene glycol 3350 17 Gram(s) Oral daily  senna 2 Tablet(s) Oral at bedtime    MEDICATIONS  (PRN):  acetaminophen     Tablet .. 650 milliGRAM(s) Oral every 6 hours PRN Temp greater or equal to 38C (100.4F), Mild Pain (1 - 3)  aluminum hydroxide/magnesium hydroxide/simethicone Suspension 30 milliLiter(s) Oral every 4 hours PRN Dyspepsia  bisacodyl 5 milliGRAM(s) Oral daily PRN Constipation  heparin   Injectable 6500 Unit(s) IV Push every 6 hours PRN For aPTT less than 40  heparin   Injectable 3000 Unit(s) IV Push every 6 hours PRN For aPTT between 40 - 57  melatonin 3 milliGRAM(s) Oral at bedtime PRN Insomnia  ondansetron Injectable 4 milliGRAM(s) IV Push every 8 hours PRN Nausea and/or Vomiting  oxyCODONE    IR 5 milliGRAM(s) Oral every 4 hours PRN Moderate Pain (4 - 6)  oxyCODONE    IR 10 milliGRAM(s) Oral every 4 hours PRN Severe Pain (7 - 10)                             Patient: BETTY NAVARRO [id number] 54y Female                            Hospitalist Attending Note    No complaints.  No chest pain / palpitations. No SOB.    No bleeding.  No leg pain.      ____________________PHYSICAL EXAM:  GENERAL:  NAD Alert and Oriented x 3   HEENT: NCAT  CARDIOVASCULAR:  S1, S2  LUNGS: CTAB  ABDOMEN:  soft, (-) tenderness, (-) distension, (+) bowel sounds, (-) guarding, (-) rebound (-) rigidity  EXTREMITIES:  no cyanosis / clubbing.  + LLE edema.   ____________________         VITALS:  Vital Signs Last 24 Hrs  T(C): 36.7 (25 Feb 2025 15:42), Max: 36.8 (25 Feb 2025 04:01)  T(F): 98 (25 Feb 2025 15:42), Max: 98.3 (25 Feb 2025 04:01)  HR: 87 (25 Feb 2025 15:42) (72 - 87)  BP: 131/84 (25 Feb 2025 15:42) (95/48 - 155/97)  BP(mean): --  RR: 16 (25 Feb 2025 15:42) (14 - 19)  SpO2: 100% (25 Feb 2025 15:42) (94% - 100%)    Parameters below as of 25 Feb 2025 15:42  Patient On (Oxygen Delivery Method): room air     Daily     Daily   CAPILLARY BLOOD GLUCOSE        I&O's Summary      HISTORY:  PAST MEDICAL & SURGICAL HISTORY:  Back pain  Hip Pain      S/P lumbar laminectomy  2008      Allergies    No Known Allergies    Intolerances       LABS:                        9.9    6.77  )-----------( 256      ( 25 Feb 2025 08:00 )             31.9       Urinalysis with Rflx Culture (collected 24 Feb 2025 16:16)    02-25    139  |  109[H]  |  4[L]  ----------------------------<  119[H]  3.3[L]   |  25  |  0.54    Ca    8.6      25 Feb 2025 08:00    TPro  6.2  /  Alb  2.4[L]  /  TBili  0.4  /  DBili  x   /  AST  10[L]  /  ALT  20  /  AlkPhos  74  02-25    PTT - ( 25 Feb 2025 09:00 )  PTT:68.8 sec  LIVER FUNCTIONS - ( 25 Feb 2025 08:00 )  Alb: 2.4 g/dL / Pro: 6.2 gm/dL / ALK PHOS: 74 U/L / ALT: 20 U/L / AST: 10 U/L / GGT: x           Urinalysis Basic - ( 25 Feb 2025 08:00 )    Color: x / Appearance: x / SG: x / pH: x  Gluc: 119 mg/dL / Ketone: x  / Bili: x / Urobili: x   Blood: x / Protein: x / Nitrite: x   Leuk Esterase: x / RBC: x / WBC x   Sq Epi: x / Non Sq Epi: x / Bacteria: x          Urinalysis with Rflx Culture (collected 24 Feb 2025 16:16)          MEDICATIONS:  MEDICATIONS  (STANDING):  cefepime   IVPB 2000 milliGRAM(s) IV Intermittent every 8 hours  heparin  Infusion.  Unit(s)/Hr (14 mL/Hr) IV Continuous <Continuous>  metroNIDAZOLE    Tablet 500 milliGRAM(s) Oral every 12 hours  naloxone Injectable 0.4 milliGRAM(s) IV Push once  polyethylene glycol 3350 17 Gram(s) Oral daily  senna 2 Tablet(s) Oral at bedtime    MEDICATIONS  (PRN):  acetaminophen     Tablet .. 650 milliGRAM(s) Oral every 6 hours PRN Temp greater or equal to 38C (100.4F), Mild Pain (1 - 3)  aluminum hydroxide/magnesium hydroxide/simethicone Suspension 30 milliLiter(s) Oral every 4 hours PRN Dyspepsia  bisacodyl 5 milliGRAM(s) Oral daily PRN Constipation  heparin   Injectable 6500 Unit(s) IV Push every 6 hours PRN For aPTT less than 40  heparin   Injectable 3000 Unit(s) IV Push every 6 hours PRN For aPTT between 40 - 57  melatonin 3 milliGRAM(s) Oral at bedtime PRN Insomnia  ondansetron Injectable 4 milliGRAM(s) IV Push every 8 hours PRN Nausea and/or Vomiting  oxyCODONE    IR 5 milliGRAM(s) Oral every 4 hours PRN Moderate Pain (4 - 6)  oxyCODONE    IR 10 milliGRAM(s) Oral every 4 hours PRN Severe Pain (7 - 10)

## 2025-02-25 NOTE — PROGRESS NOTE ADULT - ASSESSMENT
54 year old F PMH HTN, DVT/PE on xarelto p/w abd pain with associated diarrhea found to have diverticulitis with fluid collection on imaging. Also with DVT and PE.     # Sigmoid diverticulitis with fluid collection   Recent colonoscopy 3-4 weeks ago outpatient, reported normal   CT A/P w/ contrast with acute complicated sigmoid diverticulitis with adjacent fluid and air collection.  UA neg for infxn, blood cultures ordered   Started on IV abx, continue with zosyn   Evaluated by surgery, recommended IR evaluation however, per IR no appropriate window to drain.  ID consulted for antibiotic management.    # PE/DVT   History of DVT and PE in 2019 which was treated  Again had DVT in dec 2024 for which she was started on xarelto  Unclear if she had PE at the time, however PE seen on CTA on admission here  Cannot determine if this is old vs new/if failed xarelto or not  TTE not suggestive of R heart strain.  Pulmonary input requested.  Will consult Hematology.  Continue AC    # HTN Home med metoprolol succ 50mg QD  # Anemia  States h/o iron def anemia, was on iron pills, no longer taking  Monitor CBC     # Inpatient DVT Proph - on anticoagulation        54 year old F PMH HTN, DVT/PE on xarelto p/w abd pain with associated diarrhea found to have diverticulitis with fluid collection on imaging. Also with DVT and PE.     # Sigmoid diverticulitis with fluid collection   Recent colonoscopy 3-4 weeks ago outpatient, reported normal   CT A/P w/ contrast with acute complicated sigmoid diverticulitis with adjacent fluid and air collection.  UA neg for infxn, blood cultures ordered   Started on IV abx, continue with zosyn   Evaluated by surgery, recommended IR evaluation however, per IR no appropriate window to drain.  ID consulted for antibiotic management.    # PE/DVT   History of DVT and PE in 2019 which was treated  Again had DVT in dec 2024 for which she was started on xarelto  Unclear if she had PE at the time, however PE seen on CTA on admission here  Cannot determine if this is old vs new/if failed xarelto or not  TTE not suggestive of R heart strain.  Pulmonary input requested.  I consulted Tele - Hematology.  Continue AC    # HTN Home med metoprolol succ 50mg QD  # Anemia  States h/o iron def anemia, was on iron pills, no longer taking  Monitor CBC     # Inpatient DVT Proph - on anticoagulation

## 2025-02-25 NOTE — CONSULT NOTE ADULT - SUBJECTIVE AND OBJECTIVE BOX
Patient is a 54y old  Female who presents with a chief complaint of Abd pain (25 Feb 2025 10:47)    HPI: This is a 54 year old F PMH HTN, DVT/PE on xarelto p/w abd pain with associated diarrhea. Patient was given Augmentin from PCP for possible diverticulitis but symptoms continued. Admits to fever. Denies N/V. States she recently had a colonoscopy about 3-4 weeks ago with Dr. Neely, and was told her results were normal. Of note, patient was diagnosed with DVT in Dec 2024 for which she was taking xarelto. She was not checked for PE at that time. 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 States she stopped smoking ever since. Denies undergoing any hypercoaguable workup and denies any blood disorders in her family. In the ED, HDS. Labs revealed relatively unremarkable besides hgb 10.4. CT abd/pel w IV cont showed Acute complicated sigmoid diverticulitis with adjacent fluid and air collection. CTA chest was also done due to abnormality seen on CT AP, which was positive for PE. She was evaluated by surgical team who recommended IR consult. She was given IV abx in ED; ID consulted for possible abscess.  (24 Feb 2025 19:58)    Allergies: No Known Allergies      MEDICATIONS  NEURO  Meds: acetaminophen     Tablet .. 650 milliGRAM(s) Oral every 6 hours PRN Temp greater or equal to 38C (100.4F), Mild Pain (1 - 3)  melatonin 3 milliGRAM(s) Oral at bedtime PRN Insomnia  ondansetron Injectable 4 milliGRAM(s) IV Push every 8 hours PRN Nausea and/or Vomiting  oxyCODONE    IR 5 milliGRAM(s) Oral every 4 hours PRN Moderate Pain (4 - 6)  oxyCODONE    IR 10 milliGRAM(s) Oral every 4 hours PRN Severe Pain (7 - 10)    RESPIRATORY    Meds:   CARDIOVASCULAR  Meds:   GI/NUTRITION  Meds: aluminum hydroxide/magnesium hydroxide/simethicone Suspension 30 milliLiter(s) Oral every 4 hours PRN Dyspepsia  bisacodyl 5 milliGRAM(s) Oral daily PRN Constipation  polyethylene glycol 3350 17 Gram(s) Oral daily  senna 2 Tablet(s) Oral at bedtime    GENITOURINARY  Meds:   HEMATOLOGIC  Meds: heparin   Injectable 6500 Unit(s) IV Push every 6 hours PRN  heparin   Injectable 3000 Unit(s) IV Push every 6 hours PRN  heparin  Infusion.  Unit(s)/Hr IV Continuous <Continuous>    [x] VTE Prophylaxis  INFECTIOUS DISEASES  Meds: piperacillin/tazobactam IVPB.. 3.375 Gram(s) IV Intermittent every 8 hours    ENDOCRINE  CAPILLARY BLOOD GLUCOSE        Meds:   OTHER MEDICATIONS:  naloxone Injectable 0.4 milliGRAM(s) IV Push once  :    Drug Dosing Weight  Height (cm): 167.6 (24 Feb 2025 11:32)  Weight (kg): 76.7 (24 Feb 2025 11:32)  BMI (kg/m2): 27.3 (24 Feb 2025 11:32)  BSA (m2): 1.86 (24 Feb 2025 11:32)    PAST MEDICAL & SURGICAL HISTORY:  Back pain  Hip Pain  S/P lumbar laminectomy 2008      FAMILY HISTORY:  No pertinent family history in first degree relatives      SOCIAL HISTORY:  +Beer   Former smokers, quit 2019  denies illicit drug use    ADVANCE DIRECTIVES:  presumed full code     REVIEW OF SYSTEMS:  All other review of systems negative, except as noted in HPI     Vital Signs Last 24 Hrs  T(C): 36.8 (25 Feb 2025 08:00), Max: 37 (24 Feb 2025 15:20)  T(F): 98.2 (25 Feb 2025 08:00), Max: 98.6 (24 Feb 2025 15:20)  HR: 74 (25 Feb 2025 08:00) (72 - 82)  BP: 139/91 (25 Feb 2025 08:00) (95/48 - 151/86)  BP(mean): --  ABP: --  ABP(mean): --  RR: 14 (25 Feb 2025 08:00) (14 - 19)  SpO2: 94% (25 Feb 2025 08:00) (94% - 100%)    O2 Parameters below as of 25 Feb 2025 08:00  Patient On (Oxygen Delivery Method): room air      PHYSICAL EXAM:  GENERAL: NAD  HEAD:  Atraumatic   EYES: EOMI   ENMT: Moist mucous membranes  NECK: Supple   NERVOUS SYSTEM:  Awake  CHEST/LUNG: CTAB   HEART: RRR   ABDOMEN: Soft, Nontender, mildly distended with mild TTP   EXTREMITIES:   +edema   PSYCH: Mood appropriate    LABS:  CBC Full  -  ( 25 Feb 2025 08:00 )  WBC Count : 6.77 K/uL  RBC Count : 3.73 M/uL  Hemoglobin : 9.9 g/dL  Hematocrit : 31.9 %  Platelet Count - Automated : 256 K/uL  Mean Cell Volume : 85.5 fl  Mean Cell Hemoglobin : 26.5 pg  Mean Cell Hemoglobin Concentration : 31.0 g/dL  Auto Neutrophil # : x  Auto Lymphocyte # : x  Auto Monocyte # : x  Auto Eosinophil # : x  Auto Basophil # : x  Auto Neutrophil % : x  Auto Lymphocyte % : x  Auto Monocyte % : x  Auto Eosinophil % : x  Auto Basophil % : x    02-25    139  |  109[H]  |  4[L]  ----------------------------<  119[H]  3.3[L]   |  25  |  0.54    Ca    8.6      25 Feb 2025 08:00    TPro  6.2  /  Alb  2.4[L]  /  TBili  0.4  /  DBili  x   /  AST  10[L]  /  ALT  20  /  AlkPhos  74  02-25    CAPILLARY BLOOD GLUCOSE        PTT - ( 25 Feb 2025 09:00 )  PTT:68.8 sec  Urinalysis Basic - ( 25 Feb 2025 08:00 )    Color: x / Appearance: x / SG: x / pH: x  Gluc: 119 mg/dL / Ketone: x  / Bili: x / Urobili: x   Blood: x / Protein: x / Nitrite: x   Leuk Esterase: x / RBC: x / WBC x   Sq Epi: x / Non Sq Epi: x / Bacteria: x      RADIOLOGY:  < from: CT Angio Chest PE Protocol w/ IV Cont (02.24.25 @ 18:37) >  IMPRESSION:  Intraluminal filling defects in segmental/subsegmental branches of   pulmonary artery in right lower lobe, suggestive of pulmonary embolism.  3.0 x 3.0 pericardial cyst in right cardiophrenic angle.  Clinical finding was communicated with Dr. Yu at 6:50 PM on 2/24/2025    --- End of Report ---    < end of copied text >   Patient is a 54y old  Female who presents with a chief complaint of Abd pain (25 Feb 2025 10:47)    HPI: This is a 54 year old F PMH HTN, DVT/PE on xarelto p/w abd pain with associated diarrhea. Patient was given Augmentin from PCP for possible diverticulitis but symptoms continued. Admits to fever. Denies N/V. States she recently had a colonoscopy about 3-4 weeks ago with Dr. Neely, and was told her results were normal. Of note, patient was diagnosed with DVT in Dec 2024 for which she was taking xarelto. She was not checked for PE at that time. 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 States she stopped smoking ever since. Denies undergoing any hypercoaguable workup and denies any blood disorders in her family. In the ED, HDS. Labs revealed relatively unremarkable besides hgb 10.4. CT abd/pel w IV cont showed Acute complicated sigmoid diverticulitis with adjacent fluid and air collection. CTA chest was also done due to abnormality seen on CT AP, which was positive for PE. She was evaluated by surgical team who recommended IR consult. She was given IV abx in ED; ID consulted for possible abscess.  (24 Feb 2025 19:58)    Allergies: No Known Allergies      MEDICATIONS  NEURO  Meds: acetaminophen     Tablet .. 650 milliGRAM(s) Oral every 6 hours PRN Temp greater or equal to 38C (100.4F), Mild Pain (1 - 3)  melatonin 3 milliGRAM(s) Oral at bedtime PRN Insomnia  ondansetron Injectable 4 milliGRAM(s) IV Push every 8 hours PRN Nausea and/or Vomiting  oxyCODONE    IR 5 milliGRAM(s) Oral every 4 hours PRN Moderate Pain (4 - 6)  oxyCODONE    IR 10 milliGRAM(s) Oral every 4 hours PRN Severe Pain (7 - 10)    RESPIRATORY    Meds:   CARDIOVASCULAR  Meds:   GI/NUTRITION  Meds: aluminum hydroxide/magnesium hydroxide/simethicone Suspension 30 milliLiter(s) Oral every 4 hours PRN Dyspepsia  bisacodyl 5 milliGRAM(s) Oral daily PRN Constipation  polyethylene glycol 3350 17 Gram(s) Oral daily  senna 2 Tablet(s) Oral at bedtime    GENITOURINARY  Meds:   HEMATOLOGIC  Meds: heparin   Injectable 6500 Unit(s) IV Push every 6 hours PRN  heparin   Injectable 3000 Unit(s) IV Push every 6 hours PRN  heparin  Infusion.  Unit(s)/Hr IV Continuous <Continuous>    [x] VTE Prophylaxis  INFECTIOUS DISEASES  Meds: piperacillin/tazobactam IVPB.. 3.375 Gram(s) IV Intermittent every 8 hours    ENDOCRINE  CAPILLARY BLOOD GLUCOSE        Meds:   OTHER MEDICATIONS:  naloxone Injectable 0.4 milliGRAM(s) IV Push once  :    Drug Dosing Weight  Height (cm): 167.6 (24 Feb 2025 11:32)  Weight (kg): 76.7 (24 Feb 2025 11:32)  BMI (kg/m2): 27.3 (24 Feb 2025 11:32)  BSA (m2): 1.86 (24 Feb 2025 11:32)    PAST MEDICAL & SURGICAL HISTORY:  Back pain  Hip Pain  S/P lumbar laminectomy 2008      FAMILY HISTORY:  family hx of colon CA       SOCIAL HISTORY:  +Beer occasional   Former smoker 3 pks per day for 30 yrs, quit 2019  denies illicit drug use    ADVANCE DIRECTIVES:  presumed full code     REVIEW OF SYSTEMS:  All other review of systems negative, except as noted in HPI     Vital Signs Last 24 Hrs  T(C): 36.8 (25 Feb 2025 08:00), Max: 37 (24 Feb 2025 15:20)  T(F): 98.2 (25 Feb 2025 08:00), Max: 98.6 (24 Feb 2025 15:20)  HR: 74 (25 Feb 2025 08:00) (72 - 82)  BP: 139/91 (25 Feb 2025 08:00) (95/48 - 151/86)  BP(mean): --  ABP: --  ABP(mean): --  RR: 14 (25 Feb 2025 08:00) (14 - 19)  SpO2: 94% (25 Feb 2025 08:00) (94% - 100%)    O2 Parameters below as of 25 Feb 2025 08:00  Patient On (Oxygen Delivery Method): room air      PHYSICAL EXAM:  GENERAL: NAD  HEAD:  Atraumatic   EYES: EOMI   ENMT: Moist mucous membranes  NECK: Supple   NERVOUS SYSTEM:  Awake  CHEST/LUNG: CTAB   HEART: RRR   ABDOMEN: Soft, Nontender, mildly distended with mild TTP   EXTREMITIES:   +edema   PSYCH: Mood appropriate    LABS:  CBC Full  -  ( 25 Feb 2025 08:00 )  WBC Count : 6.77 K/uL  RBC Count : 3.73 M/uL  Hemoglobin : 9.9 g/dL  Hematocrit : 31.9 %  Platelet Count - Automated : 256 K/uL  Mean Cell Volume : 85.5 fl  Mean Cell Hemoglobin : 26.5 pg  Mean Cell Hemoglobin Concentration : 31.0 g/dL  Auto Neutrophil # : x  Auto Lymphocyte # : x  Auto Monocyte # : x  Auto Eosinophil # : x  Auto Basophil # : x  Auto Neutrophil % : x  Auto Lymphocyte % : x  Auto Monocyte % : x  Auto Eosinophil % : x  Auto Basophil % : x    02-25    139  |  109[H]  |  4[L]  ----------------------------<  119[H]  3.3[L]   |  25  |  0.54    Ca    8.6      25 Feb 2025 08:00    TPro  6.2  /  Alb  2.4[L]  /  TBili  0.4  /  DBili  x   /  AST  10[L]  /  ALT  20  /  AlkPhos  74  02-25    CAPILLARY BLOOD GLUCOSE        PTT - ( 25 Feb 2025 09:00 )  PTT:68.8 sec  Urinalysis Basic - ( 25 Feb 2025 08:00 )    Color: x / Appearance: x / SG: x / pH: x  Gluc: 119 mg/dL / Ketone: x  / Bili: x / Urobili: x   Blood: x / Protein: x / Nitrite: x   Leuk Esterase: x / RBC: x / WBC x   Sq Epi: x / Non Sq Epi: x / Bacteria: x      RADIOLOGY:  < from: CT Angio Chest PE Protocol w/ IV Cont (02.24.25 @ 18:37) >  IMPRESSION:  Intraluminal filling defects in segmental/subsegmental branches of   pulmonary artery in right lower lobe, suggestive of pulmonary embolism.  3.0 x 3.0 pericardial cyst in right cardiophrenic angle.  Clinical finding was communicated with Dr. Yu at 6:50 PM on 2/24/2025    --- End of Report ---    < end of copied text >

## 2025-02-25 NOTE — CONSULT NOTE ADULT - ASSESSMENT
Assessment: 54 year old F PMH HTN, DVT/PE on xarelto p/w abd pain with associated diarrhea found to have diverticulitis with fluid collection on imaging. Also with DVT and PE. Pulmonology consulted for further management.     Recs:    Assessment: 54 year old F PMH HTN, DVT/PE on xarelto p/w abd pain with associated diarrhea found to have diverticulitis with fluid collection on imaging. Also with DVT and PE. Pulmonology consulted for further management.     Recs:   - Saw patient at bedside; no respiratory symptoms at this time on RA only symptoms is left leg pain and swelling   - Per patient she was first dx in 2019 with b/l PEs and b/l DVTs had extensive [facility] suspected causmoking hx of 3 pks ppd for 30 yrs was then on Xarlto. In January, the Xarlto was held for 2 days for colonoscopy and then she had worsening leg pain which prompted her to ED founding new DVT in the setting of stopping for 2 days. Now has been compliant with Xarlto and hasnt missed a dose. She has had no recent travel and has quit smoking since 2019   - CTA showing: Intraluminal filling defects in segmental/subsegmental branches of pulmonary artery in right lower lobe, suggestive of pulmonary embolism. 3.0 x 3.0 pericardial cyst in right cardiophrenic angle.  - Would get V/Q scan if shows pulmonary HTN on echo since PA is enlarged on CT  - Continue heparin gtt per nomogram   - f/u official echo   - F/u hypercoagulable/genetic panel work up ordered for AM   - Heme- Onc consult would be appreciated  - needs malignancy work up; recent colonscopy would recommend mammogram and pap smear    - d/w Dr. Dorantes

## 2025-02-25 NOTE — CONSULT NOTE ADULT - SUBJECTIVE AND OBJECTIVE BOX
[facility]  INFECTIOUS DISEASES   [address] [place] [address]  Tel: [phone]     Fax: [phone]  ==============================================================================  DO Anahi Huffman MD Sehrish Shahid, MD Alexandra Gutman, NP   ==============================================================================    BETTY NAVARRO  MRN-[id number]  Female  54y ([birth year])        Patient is a 54y old  Female who presents with a chief complaint of Abd pain (25 Feb 2025 08:54)      HPI:  This is a 54 year old F PMH HTN, DVT/PE on xarelto p/w abd pain with associated diarrhea. Patient was given Augmentin from PCP for possible diverticulitis but symptoms continued. Admits to fever. Denies N/V. States she recently had a colonoscopy about 3-4 weeks ago with Dr. Neely, and was told her results were normal. Of note, patient was diagnosed with DVT in Dec 2024 for which she was taking xarelto. She was not checked for PE at that time. 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 States she stopped smoking ever since. Denies undergoing any hypercoaguable workup and denies any blood disorders in her family.     In the ED, HDS. Labs revealed relatively unremarkable besides hgb 10.4. CT abd/pel w IV cont showed Acute complicated sigmoid diverticulitis with adjacent fluid and air collection. CTA chest was also done due to abnormality seen on CT AP, which was positive for PE. She was evaluated by surgical team who recommended IR consult. She was given IV abx in ED.    (24 Feb 2025 19:58)      ID consulted for workup and antibiotic management     PAST MEDICAL & SURGICAL HISTORY:  Back pain  Hip Pain      S/P lumbar laminectomy  2008          Allergies  No Known Allergies        ANTIMICROBIALS:  piperacillin/tazobactam IVPB.. 3.375 every 8 hours      MEDICATIONS  (STANDING):    piperacillin/tazobactam IVPB.   200 mL/Hr IV Intermittent (02-24-25 @ 18:52)    piperacillin/tazobactam IVPB..   25 mL/Hr IV Intermittent (02-25-25 @ 05:26)   25 mL/Hr IV Intermittent (02-24-25 @ 21:57)        OTHER MEDS: MEDICATIONS  (STANDING):  acetaminophen     Tablet .. 650 every 6 hours PRN  aluminum hydroxide/magnesium hydroxide/simethicone Suspension 30 every 4 hours PRN  bisacodyl 5 daily PRN  heparin   Injectable 6500 every 6 hours PRN  heparin   Injectable 3000 every 6 hours PRN  heparin  Infusion.  <Continuous>  melatonin 3 at bedtime PRN  ondansetron Injectable 4 every 8 hours PRN  oxyCODONE    IR 5 every 4 hours PRN  oxyCODONE    IR 10 every 4 hours PRN  polyethylene glycol 3350 17 daily  senna 2 at bedtime      SOCIAL HISTORY:     Smoking Cigarettes [ ]Active [ ] Former [ ]Denies   ETOH [ ]denies [ ]Former [ ]Current Use denies   Drug Use [ ]Never [ ] Former [ ] Active     FAMILY HISTORY:  No pertinent family history in first degree relatives        REVIEW OF SYSTEMS  [  ] ROS unobtainable because:    [  ] All other systems negative except as noted below:	    Constitutional:  [ ] fever [ ] chills  [ ] weight loss  [ ] weakness  Skin:  [ ] rash [ ] phlebitis	  Eyes: [ ] icterus [ ] pain  [ ] discharge	  ENMT: [ ] sore throat  [ ] thrush [ ] ulcers [ ] exudates  Respiratory: [ ] dyspnea [ ] hemoptysis [ ] cough [ ] sputum	  Cardiovascular:  [ ] chest pain [ ] palpitations [ ] edema	  Gastrointestinal:  [ ] nausea [ ] vomiting [ ] diarrhea [ ] constipation [ ] pain	  Genitourinary:  [ ] dysuria [ ] frequency [ ] hematuria [ ] discharge [ ] flank pain  [ ] incontinence  Musculoskeletal:  [ ] myalgias [ ] arthralgias [ ] arthritis  [ ] back pain  Neurological:  [ ] headache [ ] seizures  [ ] confusion/altered mental status  Psychiatric:  [ ] anxiety [ ] depression	  Hematology/Lymphatics:  [ ] lymphadenopathy  Endocrine:  [ ] adrenal [ ] thyroid  Allergic/Immunologic:	 [ ] transplant [ ] seasonal    Vital Signs Last 24 Hrs  T(F): 98.2 (02-25-25 @ 08:00), Max: 98.6 (02-24-25 @ 15:20)    Vital Signs Last 24 Hrs  HR: 74 (02-25-25 @ 08:00) (72 - 82)  BP: 139/91 (02-25-25 @ 08:00) (95/48 - 151/86)  RR: 14 (02-25-25 @ 08:00)  SpO2: 94% (02-25-25 @ 08:00) (94% - 100%)  Wt(kg): --    PHYSICAL EXAM:  Constitutional: non-toxic, no distress  HEAD/EYES: anicteric, no conjunctival injection  ENT:  supple, no thrush  Cardiovascular:   normal S1, S2, no murmur, no edema  Respiratory:  clear BS bilaterally, no wheezes, no rales  GI:  soft, non-tender, normal bowel sounds  :  no li, no CVA tenderness  Musculoskeletal:  no synovitis, normal ROM  Neurologic: awake and alert, normal strength, no focal findings  Skin:  no rash, no erythema, no phlebitis  Heme/Onc: no lymphadenopathy   Psychiatric:  awake, alert, appropriate mood        WBC  WBC Count: 6.77 K/uL (02-25 @ 08:00)  WBC Count: 7.75 K/uL (02-25 @ 03:53)  WBC Count: 8.09 K/uL (02-24 @ 13:49)        CBC                        9.9    6.77  )-----------( 256      ( 25 Feb 2025 08:00 )             31.9     BMP/CMP  02-25    139  |  109[H]  |  4[L]  ----------------------------<  119[H]  3.3[L]   |  25  |  0.54    Ca    8.6      25 Feb 2025 08:00    TPro  6.2  /  Alb  2.4[L]  /  TBili  0.4  /  DBili  x   /  AST  10[L]  /  ALT  20  /  AlkPhos  74  02-25    Creatinine Trend  Creatinine Trend: 0.54<--, 0.57<--    Lipase: 30 U/L (02-24-25 @ 13:49)        MICROBIOLOGY:      RADIOLOGY:      ACC: [id number] EXAM:  CT ABDOMEN AND PELVIS IC   ORDERED BY: FENG GANN     PROCEDURE DATE:  02/24/2025          INTERPRETATION:  CLINICAL INFORMATION: Abdominal pain and diarrhea.    COMPARISON: None.    CONTRAST/COMPLICATIONS:  IV Contrast: Omnipaque 350  90 cc administered   10 cc discarded  Oral Contrast: NONE  .    PROCEDURE:  CT of the Abdomen and Pelvis was performed.  Sagittal and coronal reformats were performed.    FINDINGS:  LOWER CHEST: Questionable filling defect in a right lower segmental   pulmonary artery branch (2-5), may represent pulmonary embolism. Right   cardiophrenic cystic lesion (2-16), 4.7 x 2.8 cm, may represent a   pericardial cyst.    LIVER: Within normal limits.  BILE DUCTS: Normal caliber.  GALLBLADDER: Cholelithiasis.  SPLEEN: Within normal limits.  PANCREAS: Within normal limits.  ADRENALS: Within normal limits.  KIDNEYS/URETERS: Within normal limits.    BLADDER: Within normal limits.  REPRODUCTIVE ORGANS: Uterine fibroids.    BOWEL: Small hiatal hernia.No bowel obstruction. Colonic diverticulosis.   Sigmoid diverticulosis with wall thickening and associated pericolic   inflammatory change, compatible with acute sigmoid diverticulitis.   Adjacent fluid and air collection (2-126), 3.1 x 2.2 cm. No associated   large free air. Appendix is normal.  PERITONEUM/RETROPERITONEUM: Within normal limits.  VESSELS: Atherosclerotic changes. Common hepatic artery arises directly   from the aorta, anatomic variant.  LYMPH NODES: No lymphadenopathy.  ABDOMINALWALL: Small fat-containing umbilical hernia.  BONES: Degenerative changes.    IMPRESSION:  Questionable filling defect in a right lower segmental pulmonary artery   branch, may represent pulmonary embolism. Recommend further evaluation   with dedicated CTA chest.    Acute complicated sigmoid diverticulitis with adjacent fluid and air   collection.    These findings were discussed with Dr. Karli Yu at 2/24/2025 4:53 PM   by Dr. Deal with read back.      KEYLA DEAL MD  This document has been electronically signed. Feb 24 2025  4:55PM    ACC: [id number] EXAM:  [facility]ST [facility]   ORDERED BY: KARLI YU     PROCEDURE DATE:  02/24/2025          INTERPRETATION:  INDICATION: 54-year-old female is evaluated for   pulmonary embolism    TECHNIQUE: Volumetric CT angiographic acquisition of the chest was   obtained from the thoracic inlet to the upper abdomen, after    administration of intravenous contrast using a pulmonary embolus   protocol. 3D MIP and volume-rendered images were created on a separate   workstation. Coronal and sagittal maximum intensity projection images   were performed.    This CT scan was performed with one or more of the following dose   optimization techniques: iterative reconstruction, automatic exposure   control, and/or manual adjustment of mAs and kVp according to the   patient's size.    CONTRAST: 70 cc of iohexol 350    COMPARISON: The study was correlated with CT abdomen/pelvis from the same   day.    FINDINGS:  Lines and tubes: None    Pulmonary arteries: There is a good bolus of contrast in the pulmonary   arterial system. There is opacification through the distal subsegmental   level. There is no respiratory motion artifact. There are intraluminal   filling defects in segmental/subsegmental branches of pulmonary artery in   right lower lobe, suggestive of pulmonary embolism The main pulmonary   artery is mildly dilated measuring up to 3.7 cm. The heart size is within   normal limits. The right heart is not enlarged.    Mediastinum: The thyroid and thoracic inlet are normal. There are a few   subcentimeter mediastinal lymph nodes.  No pericardial effusion is   present. There is a 3.0 x 3.0 cm pericardial cyst in right cardiophrenic   angle (better visualized and measured on coronal reconstructed images..   An aberrant right subclavian artery is noted. There is a small hiatal   hernia.    Lung: Central tracheobronchial tree is patent. There is a focal   consolidation. There is no suspicious pulmonary nodule/mass.    Pleura: No effusions or pneumothorax.    Abdomen: Please refer to dedicated CT of the abdomen and pelvis.    Bone and soft tissue: The osseous structures and visualized soft tissues   demonstrate no acute abnormality.    IMPRESSION:  Intraluminal filling defects in segmental/subsegmental branches of   pulmonary artery in right lower lobe, suggestive of pulmonary embolism.  3.0 x 3.0 pericardial cyst in right cardiophrenic angle.  Clinical finding was communicated with Dr. Yu at 6:50 PM on 2/24/2025      MARCELO PISANO MD  This document has been electronically signed. Feb 24 2025  6:55PM      I have personally reviewed the above imaging  [facility]  INFECTIOUS DISEASES   [address] [place] [address]  Tel: [phone]     Fax: [phone]  ==============================================================================  DO Anahi Huffman MD Sehrish Shahid, MD Alexandra Gutman, NP   ==============================================================================    BETTY NAVARRO  MRN-[id number]  Female  54y ([birth year])        Patient is a 54y old  Female who presents with a chief complaint of Abd pain (25 Feb 2025 08:54)      HPI:  This is a 54 year old F PMH HTN, DVT/PE on xarelto p/w abd pain with associated diarrhea. Patient was given Augmentin from PCP for possible diverticulitis but symptoms continued. Admits to fever. Denies N/V. States she recently had a colonoscopy about 3-4 weeks ago with Dr. Neely, and was told her results were normal. Of note, patient was diagnosed with DVT in Dec 2024 for which she was taking xarelto. She was not checked for PE at that time. 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 States she stopped smoking ever since. Denies undergoing any hypercoaguable workup and denies any blood disorders in her family.     In the ED, HDS. Labs revealed relatively unremarkable besides hgb 10.4. CT abd/pel w IV cont showed Acute complicated sigmoid diverticulitis with adjacent fluid and air collection. CTA chest was also done due to abnormality seen on CT AP, which was positive for PE. She was evaluated by surgical team who recommended IR consult. She was given IV abx in ED.    (24 Feb 2025 19:58)    Saw Dr Winter from Hem/Onc January 29th, 2025: "53yo F here for evaluation of iron deficiency anemia and recurrent DVT. She is scheduled for TKR and needs hematology clearance.  ?  Labs from 12/4/24 showed Hgb 7.9, plts 275k, WBC 4.9. She notes having been anemic in the past. Saw heme Dr. Hill, last time around 2 years ago  She did have heavy menses, LMP was 8 months ago but had some spotting again once she restarted Xarelto in 12/26 - spotting for 5 days. She was noted to be iron deficient - she took iron tabs for 2 months since labwork. It has been on hold for upcoming colonoscopy scheduled for Fri 1/31. She had colonoscopy 10 years ago which was normal per pt report.  ?  She had a PE and b/l DVT in 2019 - s/p Xarelto for either 3 or 6 mo; saw vascular Dr. Bertrand; per report, had negative w/u and was told further AC no indicated. She notes that she traveled for a wedding 1.5 hrs there and back and thought that was provoking incident. Her LLE>RLE since. She was also smoking then but has since quit smoking.  She needs a L TKR. She has been wearing a knee brace for 2 months and has worn it tightly. She noted that her leg was very swollen and pain in Dec and on 12/26/24 imaging diagnosed w/ LLE DVT: Acute deep venous thrombosis in the left femoral, popliteal, gastrocnemius, posterior tibial, and peroneal veins. She finished 30 days of Xarelto on Sat; held b/c she has an upcoming colonoscopy. Tues woke up with new swelling and pain - went to [facility] and found new clot. Resumed Xarelto.  She had SVT previously in 2024 in LLE."         ID consulted for workup and antibiotic management     PAST MEDICAL & SURGICAL HISTORY:  Back pain  Hip Pain      S/P lumbar laminectomy  2008          Allergies  No Known Allergies        ANTIMICROBIALS:  piperacillin/tazobactam IVPB.. 3.375 every 8 hours      MEDICATIONS  (STANDING):    piperacillin/tazobactam IVPB.   200 mL/Hr IV Intermittent (02-24-25 @ 18:52)    piperacillin/tazobactam IVPB..   25 mL/Hr IV Intermittent (02-25-25 @ 05:26)   25 mL/Hr IV Intermittent (02-24-25 @ 21:57)        OTHER MEDS: MEDICATIONS  (STANDING):  acetaminophen     Tablet .. 650 every 6 hours PRN  aluminum hydroxide/magnesium hydroxide/simethicone Suspension 30 every 4 hours PRN  bisacodyl 5 daily PRN  heparin   Injectable 6500 every 6 hours PRN  heparin   Injectable 3000 every 6 hours PRN  heparin  Infusion.  <Continuous>  melatonin 3 at bedtime PRN  ondansetron Injectable 4 every 8 hours PRN  oxyCODONE    IR 5 every 4 hours PRN  oxyCODONE    IR 10 every 4 hours PRN  polyethylene glycol 3350 17 daily  senna 2 at bedtime      SOCIAL HISTORY:     Smoking Cigarettes [ ]Active [ ] Former [ ]Denies   ETOH [ ]denies [ ]Former [ ]Current Use denies   Drug Use [ ]Never [ ] Former [ ] Active     FAMILY HISTORY:  No pertinent family history in first degree relatives        REVIEW OF SYSTEMS  [  ] ROS unobtainable because:    [  ] All other systems negative except as noted below:	    Constitutional:  [ ] fever [ ] chills  [ ] weight loss  [ ] weakness  Skin:  [ ] rash [ ] phlebitis	  Eyes: [ ] icterus [ ] pain  [ ] discharge	  ENMT: [ ] sore throat  [ ] thrush [ ] ulcers [ ] exudates  Respiratory: [ ] dyspnea [ ] hemoptysis [ ] cough [ ] sputum	  Cardiovascular:  [ ] chest pain [ ] palpitations [ ] edema	  Gastrointestinal:  [ ] nausea [ ] vomiting [ ] diarrhea [ ] constipation [ ] pain	  Genitourinary:  [ ] dysuria [ ] frequency [ ] hematuria [ ] discharge [ ] flank pain  [ ] incontinence  Musculoskeletal:  [ ] myalgias [ ] arthralgias [ ] arthritis  [ ] back pain  Neurological:  [ ] headache [ ] seizures  [ ] confusion/altered mental status  Psychiatric:  [ ] anxiety [ ] depression	  Hematology/Lymphatics:  [ ] lymphadenopathy  Endocrine:  [ ] adrenal [ ] thyroid  Allergic/Immunologic:	 [ ] transplant [ ] seasonal    Vital Signs Last 24 Hrs  T(F): 98.2 (02-25-25 @ 08:00), Max: 98.6 (02-24-25 @ 15:20)    Vital Signs Last 24 Hrs  HR: 74 (02-25-25 @ 08:00) (72 - 82)  BP: 139/91 (02-25-25 @ 08:00) (95/48 - 151/86)  RR: 14 (02-25-25 @ 08:00)  SpO2: 94% (02-25-25 @ 08:00) (94% - 100%)  Wt(kg): --    PHYSICAL EXAM:  Constitutional: non-toxic, no distress  HEAD/EYES: anicteric, no conjunctival injection  ENT:  supple, no thrush  Cardiovascular:   normal S1, S2, no murmur, no edema  Respiratory:  clear BS bilaterally, no wheezes, no rales  GI:  soft, non-tender, normal bowel sounds  :  no li, no CVA tenderness  Musculoskeletal:  no synovitis, normal ROM  Neurologic: awake and alert, normal strength, no focal findings  Skin:  no rash, no erythema, no phlebitis  Heme/Onc: no lymphadenopathy   Psychiatric:  awake, alert, appropriate mood        WBC  WBC Count: 6.77 K/uL (02-25 @ 08:00)  WBC Count: 7.75 K/uL (02-25 @ 03:53)  WBC Count: 8.09 K/uL (02-24 @ 13:49)        CBC                        9.9    6.77  )-----------( 256      ( 25 Feb 2025 08:00 )             31.9     BMP/CMP  02-25    139  |  109[H]  |  4[L]  ----------------------------<  119[H]  3.3[L]   |  25  |  0.54    Ca    8.6      25 Feb 2025 08:00    TPro  6.2  /  Alb  2.4[L]  /  TBili  0.4  /  DBili  x   /  AST  10[L]  /  ALT  20  /  AlkPhos  74  02-25    Creatinine Trend  Creatinine Trend: 0.54<--, 0.57<--    Lipase: 30 U/L (02-24-25 @ 13:49)        MICROBIOLOGY:      RADIOLOGY:      ACC: [id number] EXAM:  CT ABDOMEN AND PELVIS IC   ORDERED BY: FNEG GANN     PROCEDURE DATE:  02/24/2025          INTERPRETATION:  CLINICAL INFORMATION: Abdominal pain and diarrhea.    COMPARISON: None.    CONTRAST/COMPLICATIONS:  IV Contrast: Omnipaque 350  90 cc administered   10 cc discarded  Oral Contrast: NONE  .    PROCEDURE:  CT of the Abdomen and Pelvis was performed.  Sagittal and coronal reformats were performed.    FINDINGS:  LOWER CHEST: Questionable filling defect in a right lower segmental   pulmonary artery branch (2-5), may represent pulmonary embolism. Right   cardiophrenic cystic lesion (2-16), 4.7 x 2.8 cm, may represent a   pericardial cyst.    LIVER: Within normal limits.  BILE DUCTS: Normal caliber.  GALLBLADDER: Cholelithiasis.  SPLEEN: Within normal limits.  PANCREAS: Within normal limits.  ADRENALS: Within normal limits.  KIDNEYS/URETERS: Within normal limits.    BLADDER: Within normal limits.  REPRODUCTIVE ORGANS: Uterine fibroids.    BOWEL: Small hiatal hernia.No bowel obstruction. Colonic diverticulosis.   Sigmoid diverticulosis with wall thickening and associated pericolic   inflammatory change, compatible with acute sigmoid diverticulitis.   Adjacent fluid and air collection (2-126), 3.1 x 2.2 cm. No associated   large free air. Appendix is normal.  PERITONEUM/RETROPERITONEUM: Within normal limits.  VESSELS: Atherosclerotic changes. Common hepatic artery arises directly   from the aorta, anatomic variant.  LYMPH NODES: No lymphadenopathy.  ABDOMINALWALL: Small fat-containing umbilical hernia.  BONES: Degenerative changes.    IMPRESSION:  Questionable filling defect in a right lower segmental pulmonary artery   branch, may represent pulmonary embolism. Recommend further evaluation   with dedicated CTA chest.    Acute complicated sigmoid diverticulitis with adjacent fluid and air   collection.    These findings were discussed with Dr. Karli Yu at 2/24/2025 4:53 PM   by Dr. Deal with read back.      KEYLA DEAL MD  This document has been electronically signed. Feb 24 2025  4:55PM    ACC: [id number] EXAM:  CT ANGIO CHEST [facility]   ORDERED BY: KARLI YU     PROCEDURE DATE:  02/24/2025          INTERPRETATION:  INDICATION: 54-year-old female is evaluated for   pulmonary embolism    TECHNIQUE: Volumetric CT angiographic acquisition of the chest was   obtained from the thoracic inlet to the upper abdomen, after    administration of intravenous contrast using a pulmonary embolus   protocol. 3D MIP and volume-rendered images were created on a separate   workstation. Coronal and sagittal maximum intensity projection images   were performed.    This CT scan was performed with one or more of the following dose   optimization techniques: iterative reconstruction, automatic exposure   control, and/or manual adjustment of mAs and kVp according to the   patient's size.    CONTRAST: 70 cc of iohexol 350    COMPARISON: The study was correlated with CT abdomen/pelvis from the same   day.    FINDINGS:  Lines and tubes: None    Pulmonary arteries: There is a good bolus of contrast in the pulmonary   arterial system. There is opacification through the distal subsegmental   level. There is no respiratory motion artifact. There are intraluminal   filling defects in segmental/subsegmental branches of pulmonary artery in   right lower lobe, suggestive of pulmonary embolism The main pulmonary   artery is mildly dilated measuring up to 3.7 cm. The heart size is within   normal limits. The right heart is not enlarged.    Mediastinum: The thyroid and thoracic inlet are normal. There are a few   subcentimeter mediastinal lymph nodes.  No pericardial effusion is   present. There is a 3.0 x 3.0 cm pericardial cyst in right cardiophrenic   angle (better visualized and measured on coronal reconstructed images..   An aberrant right subclavian artery is noted. There is a small hiatal   hernia.    Lung: Central tracheobronchial tree is patent. There is a focal   consolidation. There is no suspicious pulmonary nodule/mass.    Pleura: No effusions or pneumothorax.    Abdomen: Please refer to dedicated CT of the abdomen and pelvis.    Bone and soft tissue: The osseous structures and visualized soft tissues   demonstrate no acute abnormality.    IMPRESSION:  Intraluminal filling defects in segmental/subsegmental branches of   pulmonary artery in right lower lobe, suggestive of pulmonary embolism.  3.0 x 3.0 pericardial cyst in right cardiophrenic angle.  Clinical finding was communicated with Dr. Yu at 6:50 PM on 2/24/2025      MARCELO PISANO MD  This document has been electronically signed. Feb 24 2025  6:55PM      I have personally reviewed the above imaging  [facility]  INFECTIOUS DISEASES   [address] [place] [address]  Tel: [phone]     Fax: [phone]  ==============================================================================  DO Anahi Huffman MD Sehrish Shahid, MD Alexandra Gutman, NP   ==============================================================================    BETTY NAVARRO  MRN-[id number]  Female  54y ([birth year])        Patient is a 54y old  Female who presents with a chief complaint of Abd pain (25 Feb 2025 08:54)      HPI:  This is a 54 year old F PMH HTN, DVT/PE on xarelto p/w abd pain with associated diarrhea. Patient was given Augmentin from PCP for possible diverticulitis but symptoms continued. Admits to fever. Denies N/V. States she recently had a colonoscopy about 3-4 weeks ago with Dr. Neely, and was told her results were normal. Of note, patient was diagnosed with DVT in Dec 2024 for which she was taking xarelto. She was not checked for PE at that time. 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 States she stopped smoking ever since. Denies undergoing any hypercoaguable workup and denies any blood disorders in her family.     In the ED, HDS. Labs revealed relatively unremarkable besides hgb 10.4. CT abd/pel w IV cont showed Acute complicated sigmoid diverticulitis with adjacent fluid and air collection. CTA chest was also done due to abnormality seen on CT AP, which was positive for PE. She was evaluated by surgical team who recommended IR consult. She was given IV abx in ED.    (24 Feb 2025 19:58)    Saw Dr Winter from Hem/Onc January 29th, 2025: "53yo F here for evaluation of iron deficiency anemia and recurrent DVT. She is scheduled for TKR and needs hematology clearance.  ?  Labs from 12/4/24 showed Hgb 7.9, plts 275k, WBC 4.9. She notes having been anemic in the past. Saw heme Dr. Hill, last time around 2 years ago  She did have heavy menses, LMP was 8 months ago but had some spotting again once she restarted Xarelto in 12/26 - spotting for 5 days. She was noted to be iron deficient - she took iron tabs for 2 months since labwork. It has been on hold for upcoming colonoscopy scheduled for Fri 1/31. She had colonoscopy 10 years ago which was normal per pt report.  ?  She had a PE and b/l DVT in 2019 - s/p Xarelto for either 3 or 6 mo; saw vascular Dr. Bertrand; per report, had negative w/u and was told further AC no indicated. She notes that she traveled for a wedding 1.5 hrs there and back and thought that was provoking incident. Her LLE>RLE since. She was also smoking then but has since quit smoking.  She needs a L TKR. She has been wearing a knee brace for 2 months and has worn it tightly. She noted that her leg was very swollen and pain in Dec and on 12/26/24 imaging diagnosed w/ LLE DVT: Acute deep venous thrombosis in the left femoral, popliteal, gastrocnemius, posterior tibial, and peroneal veins. She finished 30 days of Xarelto on Sat; held b/c she has an upcoming colonoscopy. Tues woke up with new swelling and pain - went to [facility] and found new clot. Resumed Xarelto.  She had SVT previously in 2024 in LLE."       found to have diverticulitis with small abscess  ID consulted for workup and antibiotic management     PAST MEDICAL & SURGICAL HISTORY:  Back pain  Hip Pain      S/P lumbar laminectomy  2008          Allergies  No Known Allergies        ANTIMICROBIALS:  piperacillin/tazobactam IVPB.. 3.375 every 8 hours      MEDICATIONS  (STANDING):    piperacillin/tazobactam IVPB.   200 mL/Hr IV Intermittent (02-24-25 @ 18:52)    piperacillin/tazobactam IVPB..   25 mL/Hr IV Intermittent (02-25-25 @ 05:26)   25 mL/Hr IV Intermittent (02-24-25 @ 21:57)        OTHER MEDS: MEDICATIONS  (STANDING):  acetaminophen     Tablet .. 650 every 6 hours PRN  aluminum hydroxide/magnesium hydroxide/simethicone Suspension 30 every 4 hours PRN  bisacodyl 5 daily PRN  heparin   Injectable 6500 every 6 hours PRN  heparin   Injectable 3000 every 6 hours PRN  heparin  Infusion.  <Continuous>  melatonin 3 at bedtime PRN  ondansetron Injectable 4 every 8 hours PRN  oxyCODONE    IR 5 every 4 hours PRN  oxyCODONE    IR 10 every 4 hours PRN  polyethylene glycol 3350 17 daily  senna 2 at bedtime      SOCIAL HISTORY:     Smoking Cigarettes [ ]Active [ x] Former [ ]Denies   ETOH [ ]denies [ ]Former [x ]Current Use 4 deers daily  Drug Use [x ]Never [ ] Former [ ] Active     FAMILY HISTORY:  No pertinent family history in first degree relatives        REVIEW OF SYSTEMS  [  ] ROS unobtainable because:    [X  ] All other systems negative except as noted below:	    Constitutional:  [ ] fever [ ] chills  [ ] weight loss  [ ] weakness  Skin:  [ ] rash [ ] phlebitis	  Eyes: [ ] icterus [ ] pain  [ ] discharge	  ENMT: [ ] sore throat  [ ] thrush [ ] ulcers [ ] exudates  Respiratory: [ ] dyspnea [ ] hemoptysis [ ] cough [ ] sputum	  Cardiovascular:  [ ] chest pain [ ] palpitations [ ] edema	  Gastrointestinal:  [x ] nausea [ ] vomiting [x ] diarrhea [ ] constipation [ ] pain	  Genitourinary:  [ ] dysuria [ ] frequency [ ] hematuria [ ] discharge [ ] flank pain  [ ] incontinence  Musculoskeletal:  [ ] myalgias [ ] arthralgias [ ] arthritis  [ ] back pain  Neurological:  [ ] headache [ ] seizures  [ ] confusion/altered mental status  Psychiatric:  [ ] anxiety [ ] depression	  Hematology/Lymphatics:  [ ] lymphadenopathy  Endocrine:  [ ] adrenal [ ] thyroid  Allergic/Immunologic:	 [ ] transplant [ ] seasonal    Vital Signs Last 24 Hrs  T(F): 98.2 (02-25-25 @ 08:00), Max: 98.6 (02-24-25 @ 15:20)    Vital Signs Last 24 Hrs  HR: 74 (02-25-25 @ 08:00) (72 - 82)  BP: 139/91 (02-25-25 @ 08:00) (95/48 - 151/86)  RR: 14 (02-25-25 @ 08:00)  SpO2: 94% (02-25-25 @ 08:00) (94% - 100%)  Wt(kg): --    PHYSICAL EXAM:  Constitutional: non-toxic, no distress  HEAD/EYES: anicteric, no conjunctival injection  ENT:  supple, no thrush  Cardiovascular:   normal S1, S2, no murmur, no edema  Respiratory:  clear BS bilaterally, no wheezes, no rales  GI:  soft, non-tender, normal bowel sounds, LLQ abd pain  and terderseness   :  no li, no CVA tenderness  Musculoskeletal:  no synovitis, normal ROM  Neurologic: awake and alert, normal strength, no focal findings  Skin:  no rash, no erythema, no phlebitis  Heme/Onc: no lymphadenopathy   Psychiatric:  awake, alert, appropriate mood        WBC  WBC Count: 6.77 K/uL (02-25 @ 08:00)  WBC Count: 7.75 K/uL (02-25 @ 03:53)  WBC Count: 8.09 K/uL (02-24 @ 13:49)        CBC                        9.9    6.77  )-----------( 256      ( 25 Feb 2025 08:00 )             31.9     BMP/CMP  02-25    139  |  109[H]  |  4[L]  ----------------------------<  119[H]  3.3[L]   |  25  |  0.54    Ca    8.6      25 Feb 2025 08:00    TPro  6.2  /  Alb  2.4[L]  /  TBili  0.4  /  DBili  x   /  AST  10[L]  /  ALT  20  /  AlkPhos  74  02-25    Creatinine Trend  Creatinine Trend: 0.54<--, 0.57<--    Lipase: 30 U/L (02-24-25 @ 13:49)        MICROBIOLOGY:      RADIOLOGY:      ACC: [id number] EXAM:  CT ABDOMEN AND PELVIS IC   ORDERED BY: FENG GANN     PROCEDURE DATE:  02/24/2025          INTERPRETATION:  CLINICAL INFORMATION: Abdominal pain and diarrhea.    COMPARISON: None.    CONTRAST/COMPLICATIONS:  IV Contrast: Omnipaque 350  90 cc administered   10 cc discarded  Oral Contrast: NONE  .    PROCEDURE:  CT of the Abdomen and Pelvis was performed.  Sagittal and coronal reformats were performed.    FINDINGS:  LOWER CHEST: Questionable filling defect in a right lower segmental   pulmonary artery branch (2-5), may represent pulmonary embolism. Right   cardiophrenic cystic lesion (2-16), 4.7 x 2.8 cm, may represent a   pericardial cyst.    LIVER: Within normal limits.  BILE DUCTS: Normal caliber.  GALLBLADDER: Cholelithiasis.  SPLEEN: Within normal limits.  PANCREAS: Within normal limits.  ADRENALS: Within normal limits.  KIDNEYS/URETERS: Within normal limits.    BLADDER: Within normal limits.  REPRODUCTIVE ORGANS: Uterine fibroids.    BOWEL: Small hiatal hernia.No bowel obstruction. Colonic diverticulosis.   Sigmoid diverticulosis with wall thickening and associated pericolic   inflammatory change, compatible with acute sigmoid diverticulitis.   Adjacent fluid and air collection (2-126), 3.1 x 2.2 cm. No associated   large free air. Appendix is normal.  PERITONEUM/RETROPERITONEUM: Within normal limits.  VESSELS: Atherosclerotic changes. Common hepatic artery arises directly   from the aorta, anatomic variant.  LYMPH NODES: No lymphadenopathy.  ABDOMINALWALL: Small fat-containing umbilical hernia.  BONES: Degenerative changes.    IMPRESSION:  Questionable filling defect in a right lower segmental pulmonary artery   branch, may represent pulmonary embolism. Recommend further evaluation   with dedicated CTA chest.    Acute complicated sigmoid diverticulitis with adjacent fluid and air   collection.    These findings were discussed with Dr. Karli Yu at 2/24/2025 4:53 PM   by Dr. Deal with read back.      KEYLA DEAL MD  This document has been electronically signed. Feb 24 2025  4:55PM    ACC: [id number] [facility]   ORDERED BY: KARLI YU     PROCEDURE DATE:  02/24/2025          INTERPRETATION:  INDICATION: 54-year-old female is evaluated for   pulmonary embolism    TECHNIQUE: Volumetric CT angiographic acquisition of the chest was   obtained from the thoracic inlet to the upper abdomen, after    administration of intravenous contrast using a pulmonary embolus   protocol. 3D MIP and volume-rendered images were created on a separate   workstation. Coronal and sagittal maximum intensity projection images   were performed.    This CT scan was performed with one or more of the following dose   optimization techniques: iterative reconstruction, automatic exposure   control, and/or manual adjustment of mAs and kVp according to the   patient's size.    CONTRAST: 70 cc of iohexol 350    COMPARISON: The study was correlated with CT abdomen/pelvis from the same   day.    FINDINGS:  Lines and tubes: None    Pulmonary arteries: There is a good bolus of contrast in the pulmonary   arterial system. There is opacification through the distal subsegmental   level. There is no respiratory motion artifact. There are intraluminal   filling defects in segmental/subsegmental branches of pulmonary artery in   right lower lobe, suggestive of pulmonary embolism The main pulmonary   artery is mildly dilated measuring up to 3.7 cm. The heart size is within   normal limits. The right heart is not enlarged.    Mediastinum: The thyroid and thoracic inlet are normal. There are a few   subcentimeter mediastinal lymph nodes.  No pericardial effusion is   present. There is a 3.0 x 3.0 cm pericardial cyst in right cardiophrenic   angle (better visualized and measured on coronal reconstructed images..   An aberrant right subclavian artery is noted. There is a small hiatal   hernia.    Lung: Central tracheobronchial tree is patent. There is a focal   consolidation. There is no suspicious pulmonary nodule/mass.    Pleura: No effusions or pneumothorax.    Abdomen: Please refer to dedicated CT of the abdomen and pelvis.    Bone and soft tissue: The osseous structures and visualized soft tissues   demonstrate no acute abnormality.    IMPRESSION:  Intraluminal filling defects in segmental/subsegmental branches of   pulmonary artery in right lower lobe, suggestive of pulmonary embolism.  3.0 x 3.0 pericardial cyst in right cardiophrenic angle.  Clinical finding was communicated with Dr. Yu at 6:50 PM on 2/24/2025      MARCELO PISANO MD  This document has been electronically signed. Feb 24 2025  6:55PM      I have personally reviewed the above imaging

## 2025-02-25 NOTE — CONSULT NOTE ADULT - ASSESSMENT
------INCOMPLETE NOTE- RECOMMENDATIONS TO FOLLOW---   54 year old F PMH HTN, DVT/PE on xarelto p/w abd pain with associated diarrhea. Patient was given Augmentin from PCP for possible diverticulitis but symptoms continued. Admits to fever. Denies N/V. States she recently had a colonoscopy about 3-4 weeks ago with Dr. Neely, and was told her results were normal   CT (I personally reviewed) with divericticulitis with small abscess  has DVT and pulmonary embolism and will need anticoagulation probably indefinitely     Plan:  stop Zosyn   start cefepime   start flagyl   pain control   diet per medicine   AC per medicine       Discussed plan with primary team     Tim Arredondo, DO  Chief, Infectious Disease at [facility]  Reachable via Microsoft Teams or ID office: [phone]  Weekdays: After 5pm, please call [phone] for all inquiries and new consults  Weekends: Message on-call infectious disease physician via teams (see Nick)

## 2025-02-25 NOTE — CONSULT NOTE ADULT - NS ATTEND AMEND GEN_ALL_CORE FT
54 year old F PMH exsmoker 90py, HTN, DVT/PE on xarelto p/w diverticulitis with fluid collection and also found to have DVT and PE.  Pt has large pe and dvt 2019 and then again 2024. Stopped a/c for 2 days and then had progression of dvt and cont on a/c.    No trigger for DVT recently (trauma, travel, medications, family hx)    Pt asx from PE on CT. However does have large PA and would get echo to eval for phtn. If + would need v/q scan to eval for cteph    - cont maintain sat>90%  - remains hemodynamically stable from PE  - cont heparin gtt in case needs procedures from IR  - would Heme consult re: hypercoagulable w/u but trial of coumadin may be appropriate   - check duplex LE  - check echo to eval for phtn and rv dysfxn  - if evidence of phtn would get v/q scan to eval for cteph  - check hypercoagable w/u genetic testing  - needs age appropriate cancer screening

## 2025-02-25 NOTE — PROGRESS NOTE ADULT - SUBJECTIVE AND OBJECTIVE BOX
Patient seen and examined bedside resting comfortably.  No complaints offered.   Abdominal pain is well controlled.  Denies nausea, vomiting, diarrhea, fevers, chills. Tolerating diet.  ambulating without difficulty       T(F): 98.2 (02-25-25 @ 08:00), Max: 98.6 (02-24-25 @ 15:20)  HR: 74 (02-25-25 @ 08:00) (72 - 82)  BP: 139/91 (02-25-25 @ 08:00) (95/48 - 151/86)  RR: 14 (02-25-25 @ 08:00) (14 - 19)  SpO2: 94% (02-25-25 @ 08:00) (94% - 100%)  Wt(kg): --  CAPILLARY BLOOD GLUCOSE          PHYSICAL EXAM:  General: NAD  Neuro:  Alert & oriented x 3  HEENT: NCAT, EOMI, conjunctiva clear  Lung:respirations nonlabored, good inspiratory effort  Abdomen: soft, NTND.       LABS:                        9.9    6.77  )-----------( 256      ( 25 Feb 2025 08:00 )             31.9     02-25    139  |  109[H]  |  4[L]  ----------------------------<  119[H]  3.3[L]   |  25  |  0.54    Ca    8.6      25 Feb 2025 08:00    TPro  6.2  /  Alb  2.4[L]  /  TBili  0.4  /  DBili  x   /  AST  10[L]  /  ALT  20  /  AlkPhos  74  02-25    PTT - ( 25 Feb 2025 03:54 )  PTT:44.8 sec    I&O:           A/P: 53 yo F pmhx of htn on metoprolol, PE/DVT 2019, and recently dx with LLE DVT on xarelto here with acute sigmoid diverticulitis with adjacent fluid collection, failed outpt PO augmentin. pt also with PE started on hep gtt  -recommended NPO & IVFH, however pt given regular diet   -IV zosyn  -analgesics prn  -f/u IR consult for possible drainage   -continue care per primary team

## 2025-02-26 ENCOUNTER — APPOINTMENT (OUTPATIENT)
Dept: HEMATOLOGY ONCOLOGY | Facility: CLINIC | Age: 55
End: 2025-02-26

## 2025-02-26 LAB
ANION GAP SERPL CALC-SCNC: 3 MMOL/L — LOW (ref 5–17)
APTT BLD: 59.2 SEC — HIGH (ref 24.5–35.6)
BUN SERPL-MCNC: 5 MG/DL — LOW (ref 7–23)
CALCIUM SERPL-MCNC: 8.8 MG/DL — SIGNIFICANT CHANGE UP (ref 8.5–10.1)
CHLORIDE SERPL-SCNC: 109 MMOL/L — HIGH (ref 96–108)
CO2 SERPL-SCNC: 27 MMOL/L — SIGNIFICANT CHANGE UP (ref 22–31)
CREAT SERPL-MCNC: 0.65 MG/DL — SIGNIFICANT CHANGE UP (ref 0.5–1.3)
EGFR: 104 ML/MIN/1.73M2 — SIGNIFICANT CHANGE UP
FIBRINOGEN PPP-MCNC: 541 MG/DL — HIGH (ref 200–480)
GLUCOSE SERPL-MCNC: 132 MG/DL — HIGH (ref 70–99)
HCT VFR BLD CALC: 32 % — LOW (ref 34.5–45)
HCYS SERPL-MCNC: 9.3 UMOL/L — SIGNIFICANT CHANGE UP
HGB BLD-MCNC: 9.9 G/DL — LOW (ref 11.5–15.5)
INR BLD: 1.12 RATIO — SIGNIFICANT CHANGE UP (ref 0.85–1.16)
MCHC RBC-ENTMCNC: 26.8 PG — LOW (ref 27–34)
MCHC RBC-ENTMCNC: 30.9 G/DL — LOW (ref 32–36)
MCV RBC AUTO: 86.5 FL — SIGNIFICANT CHANGE UP (ref 80–100)
NRBC BLD AUTO-RTO: 0 /100 WBCS — SIGNIFICANT CHANGE UP (ref 0–0)
PLATELET # BLD AUTO: 290 K/UL — SIGNIFICANT CHANGE UP (ref 150–400)
POTASSIUM SERPL-MCNC: 3.4 MMOL/L — LOW (ref 3.5–5.3)
POTASSIUM SERPL-SCNC: 3.4 MMOL/L — LOW (ref 3.5–5.3)
PROTHROM AB SERPL-ACNC: 12.6 SEC — SIGNIFICANT CHANGE UP (ref 9.9–13.4)
RBC # BLD: 3.7 M/UL — LOW (ref 3.8–5.2)
RBC # FLD: 21.2 % — HIGH (ref 10.3–14.5)
SODIUM SERPL-SCNC: 139 MMOL/L — SIGNIFICANT CHANGE UP (ref 135–145)
WBC # BLD: 7.02 K/UL — SIGNIFICANT CHANGE UP (ref 3.8–10.5)
WBC # FLD AUTO: 7.02 K/UL — SIGNIFICANT CHANGE UP (ref 3.8–10.5)

## 2025-02-26 PROCEDURE — 99232 SBSQ HOSP IP/OBS MODERATE 35: CPT

## 2025-02-26 PROCEDURE — 99255 IP/OBS CONSLTJ NEW/EST HI 80: CPT

## 2025-02-26 PROCEDURE — 99233 SBSQ HOSP IP/OBS HIGH 50: CPT

## 2025-02-26 PROCEDURE — 93970 EXTREMITY STUDY: CPT | Mod: 26

## 2025-02-26 PROCEDURE — G0452: CPT | Mod: 26

## 2025-02-26 PROCEDURE — G0545: CPT

## 2025-02-26 RX ORDER — HEPARIN SODIUM 1000 [USP'U]/ML
1600 INJECTION INTRAVENOUS; SUBCUTANEOUS
Qty: 25000 | Refills: 0 | Status: DISCONTINUED | OUTPATIENT
Start: 2025-02-26 | End: 2025-02-27

## 2025-02-26 RX ADMIN — OXYCODONE HYDROCHLORIDE 10 MILLIGRAM(S): 30 TABLET ORAL at 20:49

## 2025-02-26 RX ADMIN — HEPARIN SODIUM 1600 UNIT(S)/HR: 1000 INJECTION INTRAVENOUS; SUBCUTANEOUS at 18:03

## 2025-02-26 RX ADMIN — HEPARIN SODIUM 1600 UNIT(S)/HR: 1000 INJECTION INTRAVENOUS; SUBCUTANEOUS at 07:46

## 2025-02-26 RX ADMIN — HEPARIN SODIUM 1600 UNIT(S)/HR: 1000 INJECTION INTRAVENOUS; SUBCUTANEOUS at 19:27

## 2025-02-26 RX ADMIN — OXYCODONE HYDROCHLORIDE 10 MILLIGRAM(S): 30 TABLET ORAL at 20:01

## 2025-02-26 RX ADMIN — HEPARIN SODIUM 1600 UNIT(S)/HR: 1000 INJECTION INTRAVENOUS; SUBCUTANEOUS at 16:50

## 2025-02-26 RX ADMIN — CEFEPIME 100 MILLIGRAM(S): 2 INJECTION, POWDER, FOR SOLUTION INTRAVENOUS at 21:27

## 2025-02-26 RX ADMIN — OXYCODONE HYDROCHLORIDE 5 MILLIGRAM(S): 30 TABLET ORAL at 08:30

## 2025-02-26 RX ADMIN — OXYCODONE HYDROCHLORIDE 5 MILLIGRAM(S): 30 TABLET ORAL at 07:18

## 2025-02-26 RX ADMIN — Medication 20 MILLIGRAM(S): at 14:43

## 2025-02-26 RX ADMIN — OXYCODONE HYDROCHLORIDE 10 MILLIGRAM(S): 30 TABLET ORAL at 14:28

## 2025-02-26 RX ADMIN — OXYCODONE HYDROCHLORIDE 10 MILLIGRAM(S): 30 TABLET ORAL at 08:30

## 2025-02-26 RX ADMIN — HEPARIN SODIUM 1600 UNIT(S)/HR: 1000 INJECTION INTRAVENOUS; SUBCUTANEOUS at 22:42

## 2025-02-26 RX ADMIN — OXYCODONE HYDROCHLORIDE 10 MILLIGRAM(S): 30 TABLET ORAL at 01:01

## 2025-02-26 RX ADMIN — METOPROLOL SUCCINATE 50 MILLIGRAM(S): 50 TABLET, EXTENDED RELEASE ORAL at 07:11

## 2025-02-26 RX ADMIN — HEPARIN SODIUM 1600 UNIT(S)/HR: 1000 INJECTION INTRAVENOUS; SUBCUTANEOUS at 04:49

## 2025-02-26 RX ADMIN — OXYCODONE HYDROCHLORIDE 10 MILLIGRAM(S): 30 TABLET ORAL at 02:05

## 2025-02-26 RX ADMIN — CEFEPIME 100 MILLIGRAM(S): 2 INJECTION, POWDER, FOR SOLUTION INTRAVENOUS at 14:28

## 2025-02-26 RX ADMIN — Medication 40 MILLIEQUIVALENT(S): at 12:17

## 2025-02-26 RX ADMIN — CEFEPIME 100 MILLIGRAM(S): 2 INJECTION, POWDER, FOR SOLUTION INTRAVENOUS at 07:11

## 2025-02-26 RX ADMIN — Medication 500 MILLIGRAM(S): at 18:03

## 2025-02-26 RX ADMIN — OXYCODONE HYDROCHLORIDE 10 MILLIGRAM(S): 30 TABLET ORAL at 15:28

## 2025-02-26 RX ADMIN — OXYCODONE HYDROCHLORIDE 5 MILLIGRAM(S): 30 TABLET ORAL at 02:05

## 2025-02-26 RX ADMIN — Medication 500 MILLIGRAM(S): at 07:11

## 2025-02-26 RX ADMIN — HEPARIN SODIUM 1600 UNIT(S)/HR: 1000 INJECTION INTRAVENOUS; SUBCUTANEOUS at 00:25

## 2025-02-26 RX ADMIN — OXYCODONE HYDROCHLORIDE 10 MILLIGRAM(S): 30 TABLET ORAL at 07:19

## 2025-02-26 RX ADMIN — OXYCODONE HYDROCHLORIDE 5 MILLIGRAM(S): 30 TABLET ORAL at 01:00

## 2025-02-26 NOTE — PROGRESS NOTE ADULT - SUBJECTIVE AND OBJECTIVE BOX
Patient seen and examined bedside resting comfortably.  No complaints offered. Multiple BMs.  On heparin GTT.  Abdominal pain is well controlled.  Denies nausea and vomiting. Tolerating diet.  Denies chest pain, dyspnea, cough.    T(F): 98.4 (02-26-25 @ 10:20), Max: 98.4 (02-26-25 @ 10:20)  HR: 75 (02-26-25 @ 10:20) (74 - 92)  BP: 116/75 (02-26-25 @ 10:20) (116/75 - 154/103)  RR: 19 (02-26-25 @ 10:20) (14 - 19)  SpO2: 99% (02-26-25 @ 10:20) (97% - 100%)  Wt(kg): --  CAPILLARY BLOOD GLUCOSE      PHYSICAL EXAM:  General: NAD, lying comfortably  Neuro:  Alert & oriented x 3  HEENT: NCAT, EOMI, conjunctiva clear  CV: RRR  Lung: respirations nonlabored, good inspiratory effort  Abdomen: soft, NTND.   Extremities: no pedal edema or calf tenderness noted     LABS:                        9.9    7.02  )-----------( 290      ( 26 Feb 2025 08:30 )             32.0     02-26    139  |  109[H]  |  5[L]  ----------------------------<  132[H]  3.4[L]   |  27  |  0.65    Ca    8.8      26 Feb 2025 08:30    TPro  6.2  /  Alb  2.4[L]  /  TBili  0.4  /  DBili  x   /  AST  10[L]  /  ALT  20  /  AlkPhos  74  02-25    PT/INR - ( 26 Feb 2025 08:30 )   PT: 12.6 sec;   INR: 1.12 ratio         PTT - ( 26 Feb 2025 08:30 )  PTT:59.2 sec    Culture Results:   No growth at 24 hours (02-24 @ 20:00)  Culture Results:   No growth at 24 hours (02-24 @ 20:00)

## 2025-02-26 NOTE — CONSULT NOTE ADULT - ASSESSMENT
This patient with prior cardio vascular disease has been noted to have a deep venous thrombosis in past (2019) bilateral deep venous thrombosis and history of prior pulmonary embolism. She then stopped cigarette smoking.   She had a history of wearing a brace on the left leg in December 2024; had been recommended to take rivarobaxan  20 mg PO daily by Dr Rodriguez  in December 2024 and according to the chart record she has been taking the medication on a regular basis. She has held rivarobaxan for two days prior to the colonoscopy.  She had colonoscopy as well as use of capsule one week prior.  Patient and I had a discussion using a protected platform. She will remain on heparin in hospital while treatment of diverticulitis. Outpatient therapy may be changed to apixaban 5 mg PO BID although there si no convincing evidence that apixaban is superior treatment to continuation of prior DOAC.   Please send anticardiolipin antibody, beta 2 protein screen. I doubt a genetic cause of the thrmobis that it is likely a provoked event. She may follow up with me in outpatient at [facility].

## 2025-02-26 NOTE — PROGRESS NOTE ADULT - ASSESSMENT
54 year old F PMH HTN, DVT/PE on xarelto p/w abd pain with associated diarrhea found to have diverticulitis with fluid collection on imaging. Also with DVT and PE.     # Sigmoid diverticulitis with fluid collection   Recent colonoscopy 3-4 weeks ago outpatient, reported normal   CT A/P w/ contrast with acute complicated sigmoid diverticulitis with adjacent fluid and air collection.  UA neg for infxn, blood cultures ordered   Started on IV abx, continue with zosyn   Evaluated by surgery, recommended IR evaluation however, per IR no appropriate window to drain.  ID consulted for antibiotic management.  Clinically improving on IV Abx.      # PE/DVT   History of DVT and PE in 2019 which was treated  Again had DVT in dec 2024 for which she was started on xarelto  Unclear if she had PE at the time, however PE seen on CTA on admission here  Cannot determine if this is old vs new/if failed xarelto or not  TTE not suggestive of R heart strain.  Pulmonary input requested.  I consulted Tele - Hematology - discussed with Dr. Mcclellan.  Recommendations appreciated  Continue AC with Heparin.  on discharge, will transition to Eliquis.      # HTN Home med metoprolol succ 50mg QD  # Anemia  States h/o iron def anemia, was on iron pills, no longer taking  Monitor CBC     # Inpatient DVT Proph - on anticoagulation

## 2025-02-26 NOTE — CONSULT NOTE ADULT - CONSULT REASON
Recurrent PE
acute diverticulitis with adjacent collection
diverticulitis with possible abscess
pulmonary embolism

## 2025-02-26 NOTE — PROGRESS NOTE ADULT - ASSESSMENT
54 year old F PMH HTN, DVT/PE on xarelto p/w abd pain with associated diarrhea. Patient was given Augmentin from PCP for possible diverticulitis but symptoms continued. Admits to fever. Denies N/V. States she recently had a colonoscopy about 3-4 weeks ago with Dr. Neely, and was told her results were normal   CT (I personally reviewed) with divericticulitis with small abscess  has DVT and pulmonary embolism and will need anticoagulation probably indefinitely     2/26: feeling better, no fever, RA, no WBC, Cr ok, BCs NGTD, CTA + PE, VQ scan low probability PE, seen by IR - no drainage window, abx continued, IV Cefepime and Flagyl. Pt works in a diner, discussed with the pt the importance of appropriate diet with her current diagnosis, should be seen by the nutritionist.     Plan:  continue cefepime   continue flagyl   pain control as needed   AC per medicine   pulmonology, heme/onc, IR, and surgery input is appreciated   pt will benefit from a nutritionist consult     will discuss with Dr. Arredondo  54 year old F PMH HTN, DVT/PE on xarelto p/w abd pain with associated diarrhea. Patient was given Augmentin from PCP for possible diverticulitis but symptoms continued. Admits to fever. Denies N/V. States she recently had a colonoscopy about 3-4 weeks ago with Dr. Neely, and was told her results were normal   CT (I personally reviewed) with divericticulitis with small abscess  has DVT and pulmonary embolism and will need anticoagulation probably indefinitely     2/26: feeling better, no fever, RA, no WBC, Cr ok, BCs NGTD, CTA + PE, VQ scan low probability PE, seen by IR - no drainage window, abx continued, IV Cefepime and Flagyl. Pt works in a diner, discussed with the pt the importance of appropriate diet with her current diagnosis, should be seen by the nutritionist.     Plan:  continue cefepime   continue flagyl   pain control as needed   AC per medicine   pulmonology, heme/onc, IR, and surgery input is appreciated   pt will benefit from a nutritionist consult

## 2025-02-26 NOTE — PROGRESS NOTE ADULT - SUBJECTIVE AND OBJECTIVE BOX
Patient: BETTY NAVARRO [id number] 54y Female                            Hospitalist Attending Note    No complaints.  No chest pain / palpitations. No SOB.    No bleeding.  No leg pain.      ____________________PHYSICAL EXAM:  GENERAL:  NAD Alert and Oriented x 3   HEENT: NCAT  CARDIOVASCULAR:  S1, S2  LUNGS: CTAB  ABDOMEN:  soft, (-) tenderness, (-) distension, (+) bowel sounds, (-) guarding, (-) rebound (-) rigidity  EXTREMITIES:  no cyanosis / clubbing.  + LLE edema.   ____________________    VITALS:  Vital Signs Last 24 Hrs  T(C): 37 (2025 17:07), Max: 37 (2025 17:07)  T(F): 98.6 (2025 17:07), Max: 98.6 (2025 17:07)  HR: 69 (2025 17:07) (69 - 92)  BP: 151/89 (2025 17:07) (116/75 - 154/103)  BP(mean): --  RR: 18 (2025 17:07) (14 - 19)  SpO2: 100% (2025 17:07) (97% - 100%)    Parameters below as of 2025 17:07  Patient On (Oxygen Delivery Method): room air     Daily     Daily Weight in k.6 (2025 23:30)  CAPILLARY BLOOD GLUCOSE        I&O's Summary    2025 07:  -  2025 07:00  --------------------------------------------------------  IN: 100 mL / OUT: 0 mL / NET: 100 mL    2025 07:01  -  2025 18:53  --------------------------------------------------------  IN: 467 mL / OUT: 0 mL / NET: 467 mL        LABS:                        9.9    7.02  )-----------( 290      ( 2025 08:30 )             32.0         139  |  109[H]  |  5[L]  ----------------------------<  132[H]  3.4[L]   |  27  |  0.65    Ca    8.8      2025 08:30    TPro  6.2  /  Alb  2.4[L]  /  TBili  0.4  /  DBili  x   /  AST  10[L]  /  ALT  20  /  AlkPhos  74  0225    PT/INR - ( 2025 08:30 )   PT: 12.6 sec;   INR: 1.12 ratio         PTT - ( 2025 08:30 )  PTT:59.2 sec  LIVER FUNCTIONS - ( 2025 08:00 )  Alb: 2.4 g/dL / Pro: 6.2 gm/dL / ALK PHOS: 74 U/L / ALT: 20 U/L / AST: 10 U/L / GGT: x           Urinalysis Basic - ( 2025 08:30 )    Color: x / Appearance: x / SG: x / pH: x  Gluc: 132 mg/dL / Ketone: x  / Bili: x / Urobili: x   Blood: x / Protein: x / Nitrite: x   Leuk Esterase: x / RBC: x / WBC x   Sq Epi: x / Non Sq Epi: x / Bacteria: x            Culture - Blood (collected 2025 20:00)  Source: .Blood Blood-Peripheral  Preliminary Report (2025 10:01):    No growth at 24 hours    Culture - Blood (collected 2025 20:00)  Source: .Blood Blood-Peripheral  Preliminary Report (2025 10:01):    No growth at 24 hours    Urinalysis with Rflx Culture (collected 2025 16:16)        MEDICATIONS:  acetaminophen     Tablet .. 650 milliGRAM(s) Oral every 6 hours PRN  aluminum hydroxide/magnesium hydroxide/simethicone Suspension 30 milliLiter(s) Oral every 4 hours PRN  bisacodyl 5 milliGRAM(s) Oral daily PRN  cefepime   IVPB 2000 milliGRAM(s) IV Intermittent every 8 hours  famotidine    Tablet 20 milliGRAM(s) Oral daily PRN  heparin   Injectable 6500 Unit(s) IV Push every 6 hours PRN  heparin   Injectable 3000 Unit(s) IV Push every 6 hours PRN  heparin  Infusion. 1600 Unit(s)/Hr IV Continuous <Continuous>  melatonin 3 milliGRAM(s) Oral at bedtime PRN  metoprolol succinate ER 50 milliGRAM(s) Oral daily  metroNIDAZOLE    Tablet 500 milliGRAM(s) Oral every 12 hours  naloxone Injectable 0.4 milliGRAM(s) IV Push once  ondansetron Injectable 4 milliGRAM(s) IV Push every 8 hours PRN  oxyCODONE    IR 5 milliGRAM(s) Oral every 4 hours PRN  oxyCODONE    IR 10 milliGRAM(s) Oral every 4 hours PRN  polyethylene glycol 3350 17 Gram(s) Oral daily  senna 2 Tablet(s) Oral at bedtime

## 2025-02-26 NOTE — PROGRESS NOTE ADULT - SUBJECTIVE AND OBJECTIVE BOX
CHIEF COMPLAINT:    Interval Events:        OBJECTIVE:  ICU Vital Signs Last 24 Hrs  T(C): 36.2 (26 Feb 2025 04:55), Max: 36.8 (25 Feb 2025 12:02)  T(F): 97.2 (26 Feb 2025 04:55), Max: 98.3 (25 Feb 2025 12:02)  HR: 79 (26 Feb 2025 07:00) (74 - 92)  BP: 131/87 (26 Feb 2025 04:55) (123/88 - 155/97)  BP(mean): --  ABP: --  ABP(mean): --  RR: 17 (26 Feb 2025 04:55) (14 - 18)  SpO2: 97% (26 Feb 2025 04:55) (97% - 100%)    O2 Parameters below as of 26 Feb 2025 04:55  Patient On (Oxygen Delivery Method): room air              02-25 @ 07:01 - 02-26 @ 07:00  --------------------------------------------------------  IN: 100 mL / OUT: 0 mL / NET: 100 mL    02-26 @ 07:01 - 02-26 @ 09:40  --------------------------------------------------------  IN: 467 mL / OUT: 0 mL / NET: 467 mL      CAPILLARY BLOOD GLUCOSE          PHYSICAL EXAM:  General: well, no distress   HEENT: Sclera clear, EOMI   Neck: supple no JVD  Respiratory: CTA B/L no wheezing Cough stridor  Cardiovascular: S1S2 RRR  Abdomen: soft + BS   Extremities: no edema, WAYNE   Skin: no rash / breakdown  Neurological: no focal deficits   Psychiatry: appropriate     HOSPITAL MEDICATIONS:  heparin  Infusion.  Unit(s)/Hr IV Continuous <Continuous>    cefepime   IVPB 2000 milliGRAM(s) IV Intermittent every 8 hours  metroNIDAZOLE    Tablet 500 milliGRAM(s) Oral every 12 hours    metoprolol succinate ER 50 milliGRAM(s) Oral daily        acetaminophen     Tablet .. 650 milliGRAM(s) Oral every 6 hours PRN  melatonin 3 milliGRAM(s) Oral at bedtime PRN  ondansetron Injectable 4 milliGRAM(s) IV Push every 8 hours PRN  oxyCODONE    IR 5 milliGRAM(s) Oral every 4 hours PRN  oxyCODONE    IR 10 milliGRAM(s) Oral every 4 hours PRN    aluminum hydroxide/magnesium hydroxide/simethicone Suspension 30 milliLiter(s) Oral every 4 hours PRN  bisacodyl 5 milliGRAM(s) Oral daily PRN  famotidine    Tablet 20 milliGRAM(s) Oral daily PRN  polyethylene glycol 3350 17 Gram(s) Oral daily  senna 2 Tablet(s) Oral at bedtime              naloxone Injectable 0.4 milliGRAM(s) IV Push once      LABS:                        9.9    7.02  )-----------( 290      ( 26 Feb 2025 08:30 )             32.0     02-26    139  |  109[H]  |  5[L]  ----------------------------<  132[H]  3.4[L]   |  27  |  0.65    Ca    8.8      26 Feb 2025 08:30    TPro  6.2  /  Alb  2.4[L]  /  TBili  0.4  /  DBili  x   /  AST  10[L]  /  ALT  20  /  AlkPhos  74  02-25              MICROBIOLOGY:     LIVER FUNCTIONS - ( 25 Feb 2025 08:00 )  Alb: 2.4 g/dL / Pro: 6.2 gm/dL / ALK PHOS: 74 U/L / ALT: 20 U/L / AST: 10 U/L / GGT: x           PTT - ( 25 Feb 2025 19:28 )  PTT:58.6 sec  Urinalysis Basic - ( 26 Feb 2025 08:30 )    Color: x / Appearance: x / SG: x / pH: x  Gluc: 132 mg/dL / Ketone: x  / Bili: x / Urobili: x   Blood: x / Protein: x / Nitrite: x   Leuk Esterase: x / RBC: x / WBC x   Sq Epi: x / Non Sq Epi: x / Bacteria: x          Urinalysis with Rflx Culture (collected 24 Feb 2025 16:16)        RADIOLOGY:        EKG/ECHO:   Interval Events:  -no complaints   - on hepain ggt       OBJECTIVE:  ICU Vital Signs Last 24 Hrs  T(C): 36.2 (26 Feb 2025 04:55), Max: 36.8 (25 Feb 2025 12:02)  T(F): 97.2 (26 Feb 2025 04:55), Max: 98.3 (25 Feb 2025 12:02)  HR: 79 (26 Feb 2025 07:00) (74 - 92)  BP: 131/87 (26 Feb 2025 04:55) (123/88 - 155/97)  RR: 17 (26 Feb 2025 04:55) (14 - 18)  SpO2: 97% (26 Feb 2025 04:55) (97% - 100%)    O2 Parameters below as of 26 Feb 2025 04:55  Patient On (Oxygen Delivery Method): room air      02-25 @ 07:01 - 02-26 @ 07:00  --------------------------------------------------------  IN: 100 mL / OUT: 0 mL / NET: 100 mL    02-26 @ 07:01 - 02-26 @ 09:40  --------------------------------------------------------  IN: 467 mL / OUT: 0 mL / NET: 467 mL      CAPILLARY BLOOD GLUCOSE      PHYSICAL EXAM:  General: well, no distress   HEENT: Sclera clear, EOMI   Neck: supple no JVD  Respiratory: CTA B/L no wheezing Cough stridor  Cardiovascular: S1S2 RRR  Abdomen: soft + BS   Extremities: no edema, WAYNE   Skin: no rash / breakdown  Neurological: no focal deficits   Psychiatry: appropriate     HOSPITAL MEDICATIONS:  heparin  Infusion.  Unit(s)/Hr IV Continuous <Continuous>  cefepime   IVPB 2000 milliGRAM(s) IV Intermittent every 8 hours  metroNIDAZOLE    Tablet 500 milliGRAM(s) Oral every 12 hours  metoprolol succinate ER 50 milliGRAM(s) Oral daily  acetaminophen     Tablet .. 650 milliGRAM(s) Oral every 6 hours PRN  melatonin 3 milliGRAM(s) Oral at bedtime PRN  ondansetron Injectable 4 milliGRAM(s) IV Push every 8 hours PRN  oxyCODONE    IR 5 milliGRAM(s) Oral every 4 hours PRN  oxyCODONE    IR 10 milliGRAM(s) Oral every 4 hours PRN  aluminum hydroxide/magnesium hydroxide/simethicone Suspension 30 milliLiter(s) Oral every 4 hours PRN  bisacodyl 5 milliGRAM(s) Oral daily PRN  famotidine    Tablet 20 milliGRAM(s) Oral daily PRN  polyethylene glycol 3350 17 Gram(s) Oral daily  senna 2 Tablet(s) Oral at bedtime  naloxone Injectable 0.4 milliGRAM(s) IV Push once      LABS:                        9.9    7.02  )-----------( 290      ( 26 Feb 2025 08:30 )             32.0     02-26    139  |  109[H]  |  5[L]  ----------------------------<  132[H]  3.4[L]   |  27  |  0.65    Ca    8.8      26 Feb 2025 08:30    TPro  6.2  /  Alb  2.4[L]  /  TBili  0.4  /  DBili  x   /  AST  10[L]  /  ALT  20  /  AlkPhos  74  02-25        MICROBIOLOGY:     LIVER FUNCTIONS - ( 25 Feb 2025 08:00 )  Alb: 2.4 g/dL / Pro: 6.2 gm/dL / ALK PHOS: 74 U/L / ALT: 20 U/L / AST: 10 U/L / GGT: x           PTT - ( 25 Feb 2025 19:28 )  PTT:58.6 sec  Urinalysis Basic - ( 26 Feb 2025 08:30 )    Color: x / Appearance: x / SG: x / pH: x  Gluc: 132 mg/dL / Ketone: x  / Bili: x / Urobili: x   Blood: x / Protein: x / Nitrite: x   Leuk Esterase: x / RBC: x / WBC x   Sq Epi: x / Non Sq Epi: x / Bacteria: x  Urinalysis with Rflx Culture (collected 24 Feb 2025 16:16)        RADIOLOGY:  < from: NM Pulmonary Ventilation/Perfusion Scan (02.25.25 @ 18:03) >  COMPARISON: None    OTHER STUDIES USED FOR CORRELATION: CT pulmonary angiogram 2/24/2025    FINDINGS: There is mildly heterogeneous distribution of   radiopharmaceuticals in both lungs on the ventilation and perfusion   images. There are some small matched defects seen. There is no mismatched   segmental perfusion defect.    IMPRESSION: Very low probability of pulmonary embolus. No evidence of   pulmonary hypertension.    < end of copied text >  < from: CT Angio Chest PE Protocol w/ IV Cont (02.24.25 @ 18:37) >  FINDINGS:  Lines and tubes: None    Pulmonary arteries: There is a good bolus of contrast in the pulmonary   arterial system. There is opacification through the distal subsegmental   level. There is no respiratory motion artifact. There are intraluminal   filling defects in segmental/subsegmental branches of pulmonary artery in   right lower lobe, suggestive of pulmonary embolism The main pulmonary   artery is mildly dilated measuring up to 3.7 cm. The heart size is within   normal limits. The right heart is not enlarged.    Mediastinum: The thyroid and thoracic inlet are normal. There are a few   subcentimeter mediastinal lymph nodes.  No pericardial effusion is   present. There is a 3.0 x 3.0 cm pericardial cyst in right cardiophrenic   angle (better visualized and measured on coronal reconstructed images..   An aberrant right subclavian artery is noted. There is a small hiatal   hernia.    Lung: Central tracheobronchial tree is patent. There is a focal   consolidation. There is no suspicious pulmonary nodule/mass.    Pleura: No effusions or pneumothorax.    Abdomen: Please refer to dedicated CT of the abdomen and pelvis.    Bone and soft tissue: The osseous structures and visualized soft tissues   demonstrate no acute abnormality.    IMPRESSION:  Intraluminal filling defects in segmental/subsegmental branches of   pulmonary artery in right lower lobe, suggestive of pulmonary embolism.  3.0 x 3.0 pericardial cyst in right cardiophrenic angle.  Clinical finding was communicated with Dr. Yu at 6:50 PM on 2/24/2025    < end of copied text >        EKG/ECHO:   Interval Events:  -no complaints   - on heparin ggt       OBJECTIVE:  ICU Vital Signs Last 24 Hrs  T(C): 36.2 (26 Feb 2025 04:55), Max: 36.8 (25 Feb 2025 12:02)  T(F): 97.2 (26 Feb 2025 04:55), Max: 98.3 (25 Feb 2025 12:02)  HR: 79 (26 Feb 2025 07:00) (74 - 92)  BP: 131/87 (26 Feb 2025 04:55) (123/88 - 155/97)  RR: 17 (26 Feb 2025 04:55) (14 - 18)  SpO2: 97% (26 Feb 2025 04:55) (97% - 100%)    O2 Parameters below as of 26 Feb 2025 04:55  Patient On (Oxygen Delivery Method): room air      02-25 @ 07:01 - 02-26 @ 07:00  --------------------------------------------------------  IN: 100 mL / OUT: 0 mL / NET: 100 mL    02-26 @ 07:01 - 02-26 @ 09:40  --------------------------------------------------------  IN: 467 mL / OUT: 0 mL / NET: 467 mL      CAPILLARY BLOOD GLUCOSE      PHYSICAL EXAM:  General: well, no distress   HEENT: Sclera clear, EOMI   Neck: supple no JVD  Respiratory: CTA B/L no wheezing Cough stridor  Cardiovascular: S1S2 RRR  Abdomen: soft + BS   Extremities: no edema, WAYNE   Skin: no rash / breakdown  Neurological: no focal deficits   Psychiatry: appropriate     HOSPITAL MEDICATIONS:  heparin  Infusion.  Unit(s)/Hr IV Continuous <Continuous>  cefepime   IVPB 2000 milliGRAM(s) IV Intermittent every 8 hours  metroNIDAZOLE    Tablet 500 milliGRAM(s) Oral every 12 hours  metoprolol succinate ER 50 milliGRAM(s) Oral daily  acetaminophen     Tablet .. 650 milliGRAM(s) Oral every 6 hours PRN  melatonin 3 milliGRAM(s) Oral at bedtime PRN  ondansetron Injectable 4 milliGRAM(s) IV Push every 8 hours PRN  oxyCODONE    IR 5 milliGRAM(s) Oral every 4 hours PRN  oxyCODONE    IR 10 milliGRAM(s) Oral every 4 hours PRN  aluminum hydroxide/magnesium hydroxide/simethicone Suspension 30 milliLiter(s) Oral every 4 hours PRN  bisacodyl 5 milliGRAM(s) Oral daily PRN  famotidine    Tablet 20 milliGRAM(s) Oral daily PRN  polyethylene glycol 3350 17 Gram(s) Oral daily  senna 2 Tablet(s) Oral at bedtime  naloxone Injectable 0.4 milliGRAM(s) IV Push once      LABS:                        9.9    7.02  )-----------( 290      ( 26 Feb 2025 08:30 )             32.0     02-26    139  |  109[H]  |  5[L]  ----------------------------<  132[H]  3.4[L]   |  27  |  0.65    Ca    8.8      26 Feb 2025 08:30    TPro  6.2  /  Alb  2.4[L]  /  TBili  0.4  /  DBili  x   /  AST  10[L]  /  ALT  20  /  AlkPhos  74  02-25        MICROBIOLOGY:     LIVER FUNCTIONS - ( 25 Feb 2025 08:00 )  Alb: 2.4 g/dL / Pro: 6.2 gm/dL / ALK PHOS: 74 U/L / ALT: 20 U/L / AST: 10 U/L / GGT: x           PTT - ( 25 Feb 2025 19:28 )  PTT:58.6 sec  Urinalysis Basic - ( 26 Feb 2025 08:30 )    Color: x / Appearance: x / SG: x / pH: x  Gluc: 132 mg/dL / Ketone: x  / Bili: x / Urobili: x   Blood: x / Protein: x / Nitrite: x   Leuk Esterase: x / RBC: x / WBC x   Sq Epi: x / Non Sq Epi: x / Bacteria: x  Urinalysis with Rflx Culture (collected 24 Feb 2025 16:16)        RADIOLOGY:  < from: NM Pulmonary Ventilation/Perfusion Scan (02.25.25 @ 18:03) >  COMPARISON: None    OTHER STUDIES USED FOR CORRELATION: CT pulmonary angiogram 2/24/2025    FINDINGS: There is mildly heterogeneous distribution of   radiopharmaceuticals in both lungs on the ventilation and perfusion   images. There are some small matched defects seen. There is no mismatched   segmental perfusion defect.    IMPRESSION: Very low probability of pulmonary embolus. No evidence of   pulmonary hypertension.    < end of copied text >  < from: CT Angio Chest PE Protocol w/ IV Cont (02.24.25 @ 18:37) >  FINDINGS:  Lines and tubes: None    Pulmonary arteries: There is a good bolus of contrast in the pulmonary   arterial system. There is opacification through the distal subsegmental   level. There is no respiratory motion artifact. There are intraluminal   filling defects in segmental/subsegmental branches of pulmonary artery in   right lower lobe, suggestive of pulmonary embolism The main pulmonary   artery is mildly dilated measuring up to 3.7 cm. The heart size is within   normal limits. The right heart is not enlarged.    Mediastinum: The thyroid and thoracic inlet are normal. There are a few   subcentimeter mediastinal lymph nodes.  No pericardial effusion is   present. There is a 3.0 x 3.0 cm pericardial cyst in right cardiophrenic   angle (better visualized and measured on coronal reconstructed images..   An aberrant right subclavian artery is noted. There is a small hiatal   hernia.    Lung: Central tracheobronchial tree is patent. There is a focal   consolidation. There is no suspicious pulmonary nodule/mass.    Pleura: No effusions or pneumothorax.    Abdomen: Please refer to dedicated CT of the abdomen and pelvis.    Bone and soft tissue: The osseous structures and visualized soft tissues   demonstrate no acute abnormality.    IMPRESSION:  Intraluminal filling defects in segmental/subsegmental branches of   pulmonary artery in right lower lobe, suggestive of pulmonary embolism.  3.0 x 3.0 pericardial cyst in right cardiophrenic angle.  Clinical finding was communicated with Dr. Yu at 6:50 PM on 2/24/2025    < end of copied text >        EKG/ECHO:

## 2025-02-26 NOTE — PROGRESS NOTE ADULT - SUBJECTIVE AND OBJECTIVE BOX
BETTY NAVARRO  MRN-[id number]  54y (1970)    Follow Up:  acute diverticulitis, PE    Interval History: The pt was seen and examined earlier, not in acute distress, no new complaints, states that she is feeling better overall, drinking soda. Pt is afebrile, RA, no WBC.     PAST MEDICAL & SURGICAL HISTORY:  Back pain  Hip Pain      S/P lumbar laminectomy  2008          ROS:    [ ] Unobtainable because:  [x ] All other systems negative    Constitutional: no fever, no chills  Head: no trauma  Eyes: no vision changes, no eye pain  ENT:  no sore throat, no rhinorrhea  Cardiovascular:  no chest pain, no palpitation  Respiratory:  no SOB, no cough  GI:  no abd pain, no vomiting, no diarrhea  urinary: no dysuria, no hematuria, no flank pain  musculoskeletal:  no joint pain, no joint swelling  skin:  no rash  neurology:  no headache, no seizure, no change in mental status  psych: no anxiety, no depression         Allergies  No Known Allergies        ANTIMICROBIALS:  cefepime   IVPB 2000 every 8 hours  metroNIDAZOLE    Tablet 500 every 12 hours      OTHER MEDS:  acetaminophen     Tablet .. 650 milliGRAM(s) Oral every 6 hours PRN  aluminum hydroxide/magnesium hydroxide/simethicone Suspension 30 milliLiter(s) Oral every 4 hours PRN  bisacodyl 5 milliGRAM(s) Oral daily PRN  famotidine    Tablet 20 milliGRAM(s) Oral daily PRN  heparin   Injectable 6500 Unit(s) IV Push every 6 hours PRN  heparin   Injectable 3000 Unit(s) IV Push every 6 hours PRN  heparin  Infusion.  Unit(s)/Hr IV Continuous <Continuous>  melatonin 3 milliGRAM(s) Oral at bedtime PRN  metoprolol succinate ER 50 milliGRAM(s) Oral daily  naloxone Injectable 0.4 milliGRAM(s) IV Push once  ondansetron Injectable 4 milliGRAM(s) IV Push every 8 hours PRN  oxyCODONE    IR 5 milliGRAM(s) Oral every 4 hours PRN  oxyCODONE    IR 10 milliGRAM(s) Oral every 4 hours PRN  polyethylene glycol 3350 17 Gram(s) Oral daily  senna 2 Tablet(s) Oral at bedtime      Vital Signs Last 24 Hrs  T(C): 36.9 (26 Feb 2025 10:20), Max: 36.9 (26 Feb 2025 10:20)  T(F): 98.4 (26 Feb 2025 10:20), Max: 98.4 (26 Feb 2025 10:20)  HR: 75 (26 Feb 2025 10:20) (74 - 92)  BP: 116/75 (26 Feb 2025 10:20) (116/75 - 154/103)  BP(mean): --  RR: 19 (26 Feb 2025 10:20) (14 - 19)  SpO2: 99% (26 Feb 2025 10:20) (97% - 100%)    Parameters below as of 26 Feb 2025 10:20  Patient On (Oxygen Delivery Method): room air        Physical Exam:  Constitutional: non-toxic, no distress, RA  HEAD/EYES: anicteric, no conjunctival injection  ENT:  supple, no thrush  Cardiovascular:   normal S1, S2, no murmur, no edema  Respiratory:  clear BS bilaterally, no wheezes, no rales  GI:  soft, non-tender, normal bowel sounds, abd is not tender on today's exam  :  no li, no CVA tenderness  Musculoskeletal:  no synovitis, normal ROM  Neurologic: awake and alert, normal strength, no focal findings  Skin:  no rash, no erythema, no phlebitis  Heme/Onc: no lymphadenopathy   Psychiatric:  awake, alert, appropriate mood    WBC Count: 7.02 K/uL (02-26 @ 08:30)  WBC Count: 6.77 K/uL (02-25 @ 08:00)  WBC Count: 7.75 K/uL (02-25 @ 03:53)  WBC Count: 8.09 K/uL (02-24 @ 13:49)                            9.9    7.02  )-----------( 290      ( 26 Feb 2025 08:30 )             32.0       02-26    139  |  109[H]  |  5[L]  ----------------------------<  132[H]  3.4[L]   |  27  |  0.65    Ca    8.8      26 Feb 2025 08:30    TPro  6.2  /  Alb  2.4[L]  /  TBili  0.4  /  DBili  x   /  AST  10[L]  /  ALT  20  /  AlkPhos  74  02-25      Urinalysis Basic - ( 26 Feb 2025 08:30 )    Color: x / Appearance: x / SG: x / pH: x  Gluc: 132 mg/dL / Ketone: x  / Bili: x / Urobili: x   Blood: x / Protein: x / Nitrite: x   Leuk Esterase: x / RBC: x / WBC x   Sq Epi: x / Non Sq Epi: x / Bacteria: x        Creatinine Trend: 0.65<--, 0.54<--, 0.57<--      MICROBIOLOGY:  v  .Blood Blood-Peripheral  02-24-25   No growth at 24 hours  --  --    RADIOLOGY:     Clothing/locker

## 2025-02-26 NOTE — CONSULT NOTE ADULT - SUBJECTIVE AND OBJECTIVE BOX
HPI:  This is a 54 year old F PMH HTN, DVT/PE on xarelto p/w abd pain with associated diarrhea. Patient was given Augmentin from PCP for possible diverticulitis but symptoms continued. Admits to fever. Denies N/V. States she recently had a colonoscopy about 3-4 weeks ago with Dr. Neely, and was told her results were normal. Of note, patient was diagnosed with DVT in Dec 2024 for which she was taking xarelto. She was not checked for PE at that time. 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 States she stopped smoking ever since. Denies undergoing any hypercoaguable workup and denies any blood disorders in her family.     In the ED, HDS. Labs revealed relatively unremarkable besides hgb 10.4. CT abd/pel w IV cont showed Acute complicated sigmoid diverticulitis with adjacent fluid and air collection. CTA chest was also done due to abnormality seen on CT AP, which was positive for PE. She was evaluated by surgical team who recommended IR consult. She was given IV abx in ED.    (24 Feb 2025 19:58)      PAST MEDICAL & SURGICAL HISTORY:  Back pain  Hip Pain      S/P lumbar laminectomy  2008          Allergies    No Known Allergies    Intolerances        MEDICATIONS  (STANDING):  cefepime   IVPB 2000 milliGRAM(s) IV Intermittent every 8 hours  heparin  Infusion.  Unit(s)/Hr (14 mL/Hr) IV Continuous <Continuous>  metoprolol succinate ER 50 milliGRAM(s) Oral daily  metroNIDAZOLE    Tablet 500 milliGRAM(s) Oral every 12 hours  naloxone Injectable 0.4 milliGRAM(s) IV Push once  polyethylene glycol 3350 17 Gram(s) Oral daily  senna 2 Tablet(s) Oral at bedtime    MEDICATIONS  (PRN):  acetaminophen     Tablet .. 650 milliGRAM(s) Oral every 6 hours PRN Temp greater or equal to 38C (100.4F), Mild Pain (1 - 3)  aluminum hydroxide/magnesium hydroxide/simethicone Suspension 30 milliLiter(s) Oral every 4 hours PRN Dyspepsia  bisacodyl 5 milliGRAM(s) Oral daily PRN Constipation  famotidine    Tablet 20 milliGRAM(s) Oral daily PRN heartburn  heparin   Injectable 6500 Unit(s) IV Push every 6 hours PRN For aPTT less than 40  heparin   Injectable 3000 Unit(s) IV Push every 6 hours PRN For aPTT between 40 - 57  melatonin 3 milliGRAM(s) Oral at bedtime PRN Insomnia  ondansetron Injectable 4 milliGRAM(s) IV Push every 8 hours PRN Nausea and/or Vomiting  oxyCODONE    IR 5 milliGRAM(s) Oral every 4 hours PRN Moderate Pain (4 - 6)  oxyCODONE    IR 10 milliGRAM(s) Oral every 4 hours PRN Severe Pain (7 - 10)      FAMILY HISTORY:  No pertinent family history in first degree relatives        SOCIAL HISTORY: No EtOH, no tobacco    REVIEW OF SYSTEMS:    CONSTITUTIONAL: No weakness, fevers or chills  EYES/ENT: No visual changes;  No vertigo or throat pain   NECK: No pain or stiffness  RESPIRATORY: No cough, wheezing, hemoptysis; No shortness of breath  CARDIOVASCULAR: No chest pain or palpitations  GASTROINTESTINAL: No abdominal or epigastric pain. No nausea, vomiting, or hematemesis; No diarrhea or constipation. No melena or hematochezia.  GENITOURINARY: No dysuria, frequency or hematuria  NEUROLOGICAL: No numbness or weakness  SKIN: No itching, burning, rashes, or lesions   All other review of systems is negative unless indicated above.      Weight (kg): 78.6 (02-25 @ 23:30)    T(F): 98.4 (02-26-25 @ 10:20), Max: 98.4 (02-26-25 @ 10:20)  HR: 75 (02-26-25 @ 10:20)  BP: 116/75 (02-26-25 @ 10:20)  RR: 19 (02-26-25 @ 10:20)  SpO2: 99% (02-26-25 @ 10:20)  Wt(kg): --    GENERAL: audio visit only ; refer to admission ER note  HEAD:   EYES:  NECK:   CHEST/LUNG:   HEART:    ABDOMEN:   EXTREMITIES:  NEUROLOGY:   SKIN:                          9.9    7.02  )-----------( 290      ( 26 Feb 2025 08:30 )             32.0       02-26    139  |  109[H]  |  5[L]  ----------------------------<  132[H]  3.4[L]   |  27  |  0.65    Ca    8.8      26 Feb 2025 08:30    TPro  6.2  /  Alb  2.4[L]  /  TBili  0.4  /  DBili  x   /  AST  10[L]  /  ALT  20  /  AlkPhos  74  02-25

## 2025-02-26 NOTE — PROGRESS NOTE ADULT - ASSESSMENT
54F w/ PMHx lumbar laminectomy admitted with sigmoid diverticulitis with a collection.  IR evaluated and no window for drainage.    Plan as discussed with Dr. Sifuentes:  - Continue Abx per ID  - Trend labs  - Monitor vital signs  - Continue care per primary team

## 2025-02-26 NOTE — PROGRESS NOTE ADULT - ASSESSMENT
Assessment: 54 year old F PMH HTN, DVT/PE on xarelto p/w abd pain with associated diarrhea found to have diverticulitis with fluid collection on imaging. Also with DVT and PE. Pulmonology consulted for further management.     Recs:   - Saw patient at bedside; no respiratory symptoms at this time on RA only symptoms is left leg pain and swelling   - Per patient she was first dx in 2019 with b/l PEs and b/l DVTs had extensive [facility] suspected causmoking hx of 3 pks ppd for 30 yrs was then on Xarlto. In January, the Xarlto was held for 2 days for colonoscopy and then she had worsening leg pain which prompted her to ED founding new DVT in the setting of stopping for 2 days. Now has been compliant with Xarlto and hasnt missed a dose. She has had no recent travel and has quit smoking since 2019   - CTA showing: Intraluminal filling defects in segmental/subsegmental branches of pulmonary artery in right lower lobe, suggestive of pulmonary embolism. 3.0 x 3.0 pericardial cyst in right cardiophrenic angle.  - Would get V/Q scan if shows pulmonary HTN on echo since PA is enlarged on CT  - Continue heparin gtt per nomogram   - f/u official echo   - F/u hypercoagulable/genetic panel work up ordered for AM   - Heme- Onc consult would be appreciated  - needs malignancy work up; recent colonscopy would recommend mammogram and pap smear    - d/w Dr. Dorantes    Assessment: 54 year old F PMH HTN, DVT/PE on xarelto p/w abd pain with associated diarrhea found to have diverticulitis with fluid collection on imaging. Also with DVT and PE. Pulmonology consulted for further management.     Recs:     - Saw patient at bedside; no respiratory symptoms at this time on RA only symptoms is left leg pain and swelling   - Per patient she was first dx in 2019 with b/l PEs and b/l DVTs had extensive [facility] suspected causmoking hx of 3 pks ppd for 30 yrs was then on Xarlto. In January, the Xarlto was held for 2 days for colonoscopy and then she had worsening leg pain which prompted her to ED founding new DVT in the setting of stopping for 2 days. Now has been compliant with Xarlto and hasnt missed a dose. She has had no recent travel and has quit smoking since 2019   - CTA showing: Intraluminal filling defects in segmental/subsegmental branches of pulmonary artery in right lower lobe, suggestive of pulmonary embolism. 3.0 x 3.0 pericardial cyst in right cardiophrenic angle.  - V/Q scan- negative for PE   - Continue heparin gtt per nomogram   - f/u official echo   - F/u hypercoagulable/genetic panel work up ordered for AM   - Heme- Onc consult would be appreciated  - needs malignancy work up; recent colonscopy would recommend mammogram and pap smear    - d/w Dr. Dorantes

## 2025-02-27 ENCOUNTER — TRANSCRIPTION ENCOUNTER (OUTPATIENT)
Age: 55
End: 2025-02-27

## 2025-02-27 VITALS
TEMPERATURE: 98 F | RESPIRATION RATE: 16 BRPM | SYSTOLIC BLOOD PRESSURE: 139 MMHG | OXYGEN SATURATION: 100 % | DIASTOLIC BLOOD PRESSURE: 84 MMHG | HEART RATE: 80 BPM

## 2025-02-27 LAB
APTT BLD: 66.9 SEC — HIGH (ref 24.5–35.6)
APTT BLD: 88.5 SEC — HIGH (ref 24.5–35.6)
AT III ACT/NOR PPP CHRO: 68 % — LOW (ref 85–135)
AT III AG PPP IA-MCNC: 18 MG/DL — LOW (ref 19–31)
DRVVT RATIO: 0.95 RATIO — SIGNIFICANT CHANGE UP (ref 0–1.21)
DRVVT SCREEN TO CONFIRM RATIO: SIGNIFICANT CHANGE UP
HCT VFR BLD CALC: 32.7 % — LOW (ref 34.5–45)
HCT VFR BLD CALC: 34.6 % — SIGNIFICANT CHANGE UP (ref 34.5–45)
HGB BLD-MCNC: 10.1 G/DL — LOW (ref 11.5–15.5)
HGB BLD-MCNC: 10.5 G/DL — LOW (ref 11.5–15.5)
MCHC RBC-ENTMCNC: 26.1 PG — LOW (ref 27–34)
MCHC RBC-ENTMCNC: 26.4 PG — LOW (ref 27–34)
MCHC RBC-ENTMCNC: 30.3 G/DL — LOW (ref 32–36)
MCHC RBC-ENTMCNC: 30.9 G/DL — LOW (ref 32–36)
MCV RBC AUTO: 85.4 FL — SIGNIFICANT CHANGE UP (ref 80–100)
MCV RBC AUTO: 86.1 FL — SIGNIFICANT CHANGE UP (ref 80–100)
NRBC BLD AUTO-RTO: 0 /100 WBCS — SIGNIFICANT CHANGE UP (ref 0–0)
NRBC BLD AUTO-RTO: 0 /100 WBCS — SIGNIFICANT CHANGE UP (ref 0–0)
PLATELET # BLD AUTO: 316 K/UL — SIGNIFICANT CHANGE UP (ref 150–400)
PLATELET # BLD AUTO: 329 K/UL — SIGNIFICANT CHANGE UP (ref 150–400)
PROT C ACT/NOR PPP: 85 % — SIGNIFICANT CHANGE UP (ref 74–150)
PROT S FREE PPP-ACNC: 78 % — SIGNIFICANT CHANGE UP (ref 63–140)
RBC # BLD: 3.83 M/UL — SIGNIFICANT CHANGE UP (ref 3.8–5.2)
RBC # BLD: 4.02 M/UL — SIGNIFICANT CHANGE UP (ref 3.8–5.2)
RBC # FLD: 21 % — HIGH (ref 10.3–14.5)
RBC # FLD: 21.2 % — HIGH (ref 10.3–14.5)
WBC # BLD: 6.17 K/UL — SIGNIFICANT CHANGE UP (ref 3.8–10.5)
WBC # BLD: 6.49 K/UL — SIGNIFICANT CHANGE UP (ref 3.8–10.5)
WBC # FLD AUTO: 6.17 K/UL — SIGNIFICANT CHANGE UP (ref 3.8–10.5)
WBC # FLD AUTO: 6.49 K/UL — SIGNIFICANT CHANGE UP (ref 3.8–10.5)

## 2025-02-27 PROCEDURE — 99233 SBSQ HOSP IP/OBS HIGH 50: CPT

## 2025-02-27 PROCEDURE — 99239 HOSP IP/OBS DSCHRG MGMT >30: CPT

## 2025-02-27 PROCEDURE — G0545: CPT

## 2025-02-27 PROCEDURE — 99231 SBSQ HOSP IP/OBS SF/LOW 25: CPT

## 2025-02-27 RX ORDER — METOPROLOL SUCCINATE 50 MG/1
1 TABLET, EXTENDED RELEASE ORAL
Qty: 30 | Refills: 0
Start: 2025-02-27

## 2025-02-27 RX ORDER — AMOXICILLIN AND CLAVULANATE POTASSIUM 500; 125 MG/1; MG/1
1 TABLET, FILM COATED ORAL
Qty: 28 | Refills: 0
Start: 2025-02-27

## 2025-02-27 RX ORDER — APIXABAN 2.5 MG/1
1 TABLET, FILM COATED ORAL
Qty: 60 | Refills: 0
Start: 2025-02-27

## 2025-02-27 RX ORDER — APIXABAN 2.5 MG/1
5 TABLET, FILM COATED ORAL EVERY 12 HOURS
Refills: 0 | Status: DISCONTINUED | OUTPATIENT
Start: 2025-02-27 | End: 2025-02-27

## 2025-02-27 RX ADMIN — HEPARIN SODIUM 1600 UNIT(S)/HR: 1000 INJECTION INTRAVENOUS; SUBCUTANEOUS at 00:27

## 2025-02-27 RX ADMIN — CEFEPIME 100 MILLIGRAM(S): 2 INJECTION, POWDER, FOR SOLUTION INTRAVENOUS at 05:56

## 2025-02-27 RX ADMIN — OXYCODONE HYDROCHLORIDE 10 MILLIGRAM(S): 30 TABLET ORAL at 07:52

## 2025-02-27 RX ADMIN — Medication 500 MILLIGRAM(S): at 17:53

## 2025-02-27 RX ADMIN — OXYCODONE HYDROCHLORIDE 10 MILLIGRAM(S): 30 TABLET ORAL at 08:52

## 2025-02-27 RX ADMIN — Medication 20 MILLIGRAM(S): at 05:56

## 2025-02-27 RX ADMIN — CEFEPIME 100 MILLIGRAM(S): 2 INJECTION, POWDER, FOR SOLUTION INTRAVENOUS at 15:13

## 2025-02-27 RX ADMIN — HEPARIN SODIUM 1600 UNIT(S)/HR: 1000 INJECTION INTRAVENOUS; SUBCUTANEOUS at 09:55

## 2025-02-27 RX ADMIN — OXYCODONE HYDROCHLORIDE 10 MILLIGRAM(S): 30 TABLET ORAL at 16:04

## 2025-02-27 RX ADMIN — OXYCODONE HYDROCHLORIDE 10 MILLIGRAM(S): 30 TABLET ORAL at 17:00

## 2025-02-27 RX ADMIN — HEPARIN SODIUM 1600 UNIT(S)/HR: 1000 INJECTION INTRAVENOUS; SUBCUTANEOUS at 07:05

## 2025-02-27 RX ADMIN — Medication 500 MILLIGRAM(S): at 05:57

## 2025-02-27 RX ADMIN — APIXABAN 5 MILLIGRAM(S): 2.5 TABLET, FILM COATED ORAL at 14:20

## 2025-02-27 RX ADMIN — METOPROLOL SUCCINATE 50 MILLIGRAM(S): 50 TABLET, EXTENDED RELEASE ORAL at 05:57

## 2025-02-27 RX ADMIN — OXYCODONE HYDROCHLORIDE 10 MILLIGRAM(S): 30 TABLET ORAL at 01:51

## 2025-02-27 NOTE — PROGRESS NOTE ADULT - NS ATTEND AMEND GEN_ALL_CORE FT
I have reviewed all pertinent clinical information and agree with the NP's note.  Any new labs, recent cultures, new imaging (if applicable) and vitals have been reviewed today.  All necessary adjustments to management have been made.  Agree with the above assessment and plan.    perf diverticulitis   chronic alcohol use   DVT and pulmonary embolism     continue cefepime   continue flagyl   pain control as needed   AC per medicine   pulmonology, heme/onc, IR, and surgery input is appreciated   pt will benefit from a nutritionist consult     Discussed plan with Dr Johnny Arredondo, DO  Chief, Infectious Disease at [facility]  Reachable via Microsoft Teams or ID office: [phone]  Weekdays: After 5pm, please call [phone] for all inquiries and new consults  Weekends: Message on-call infectious disease physician via teams (arlin Romero)
54 year old F PMH exsmoker 90py, HTN, DVT/PE on xarelto p/w diverticulitis with fluid collection and also found to have DVT and PE.  Pt has large pe and dvt 2019 and then again 2024. Stopped a/c for 2 days and then had progression of dvt and cont on a/c.    No trigger for DVT recently (trauma, travel, medications, family hx)    Pt asx from PE on CT. However does have large PA and would get echo to eval for phtn. If + would need v/q scan to eval for cteph      2/26: d/w nuclear and ct radiology regarding the discordance between v/q and CTA. PE definitely present on CT chest. However may not be clinically significant as perfusion negative    - cont maintain sat>90%, remains on ra  - can check ambulatory sat  - remains hemodynamically stable from PE  - cont heparin gtt in case needs procedures from IR but if none planned for apixaban  - f/u Heme reccs- think repeat DVT is provoked and can use noac  - check duplex LE- chronic L clot  - negative echo  for phtn and rv dysfxn  - v/q negative for PE (low probability) but may represent clinically insignificant clot given CTA   - f/u hypercoagable w/u genetic testing  - needs age appropriate cancer screening    -pt states she will f/u in Pulmonary clinic please give info
54 year old F PMH exsmoker 90py, HTN, DVT/PE on xarelto p/w diverticulitis with fluid collection and also found to have DVT and PE.  Pt has large pe and dvt 2019 and then again 2024. Stopped a/c for 2 days and then had progression of dvt and cont on a/c.    No trigger for DVT recently (trauma, travel, medications, family hx)    Pt asx from PE on CT. However does have large PA and would get echo to eval for phtn. If + would need v/q scan to eval for cteph      2/26: d/w nuclear and ct radiology regarding the discordance between v/q and CTA. PE definitely present on CT chest. However may not be clinically significant as perfusion negative    - cont maintain sat>90%  - remains hemodynamically stable from PE  - cont heparin gtt in case needs procedures from IR  - f/u Heme reccs- think repeat DVT is provoked and can use noac  - check duplex LE  - negative echo  for phtn and rv dysfxn  - v/q negative for PE (low probability) but may represent clinically insignificant clot  - f/u hypercoagable w/u genetic testing  - needs age appropriate cancer screening

## 2025-02-27 NOTE — PROGRESS NOTE ADULT - SUBJECTIVE AND OBJECTIVE BOX
[facility]  INFECTIOUS DISEASES   [address] [place] [address]  Tel: [phone]     Fax: [phone]  ==============================================================================  DO Anahi Huffman MD Alexandra Gutman, NP   ==============================================================================      BETTY NAVARRO  MRN-[id number]  54y ([birth year])      Interval History: patient seen and examined. Doing well and feeling much better. Upset due to personal issues at home and with family. patient aware of alcohol dependence and wants to quit, met with  who gave her resources for outpatient help     ROS:    [ ] Unobtainable because:  [x ] All other systems negative except as noted    Constitutional: no fever, no chills  Head: no trauma  Eyes: no vision changes, no eye pain  ENT:  no sore throat, no rhinorrhea  Cardiovascular:  no chest pain, no palpitation  Respiratory:  no SOB, no cough  GI:  no abd pain, no vomiting, no diarrhea  urinary: no dysuria, no hematuria, no flank pain  musculoskeletal:  no joint pain, no joint swelling  skin:  no rash  neurology:  no headache, no seizure, no change in mental status  psych: no anxiety, no depression         Allergies  No Known Allergies        ANTIMICROBIALS:  cefepime   IVPB 2000 every 8 hours  metroNIDAZOLE    Tablet 500 every 12 hours      OTHER MEDS:  acetaminophen     Tablet .. 650 milliGRAM(s) Oral every 6 hours PRN  aluminum hydroxide/magnesium hydroxide/simethicone Suspension 30 milliLiter(s) Oral every 4 hours PRN  apixaban 5 milliGRAM(s) Oral every 12 hours  bisacodyl 5 milliGRAM(s) Oral daily PRN  famotidine    Tablet 20 milliGRAM(s) Oral daily PRN  melatonin 3 milliGRAM(s) Oral at bedtime PRN  metoprolol succinate ER 50 milliGRAM(s) Oral daily  naloxone Injectable 0.4 milliGRAM(s) IV Push once  ondansetron Injectable 4 milliGRAM(s) IV Push every 8 hours PRN  oxyCODONE    IR 5 milliGRAM(s) Oral every 4 hours PRN  oxyCODONE    IR 10 milliGRAM(s) Oral every 4 hours PRN  polyethylene glycol 3350 17 Gram(s) Oral daily  senna 2 Tablet(s) Oral at bedtime      Physical Exam:  Vital Signs Last 24 Hrs  T(C): 36.7 (27 Feb 2025 17:13), Max: 36.8 (27 Feb 2025 04:38)  T(F): 98.1 (27 Feb 2025 17:13), Max: 98.2 (27 Feb 2025 04:38)  HR: 80 (27 Feb 2025 17:13) (62 - 80)  BP: 139/84 (27 Feb 2025 17:13) (116/83 - 139/84)  BP(mean): --  RR: 16 (27 Feb 2025 17:13) (16 - 18)  SpO2: 100% (27 Feb 2025 17:13) (95% - 100%)    Parameters below as of 27 Feb 2025 17:13  Patient On (Oxygen Delivery Method): room air        General:    NAD,  non toxic  Head: atraumatic, normocephalic  Eye: normal sclera and conjunctiva  ENT:    no oral lesions, neck supple  Cardio:     regular S1, S2,  no murmur  Respiratory:    clear b/l,    no wheezing  abd:     soft,   BS +,   no tenderness  :   no CVAT,  no suprapubic tenderness,   no  li  Musculoskeletal:   no joint swelling,   no edema  vascular: no central lines, +PIV   Skin:    no rash  Neurologic:     no focal deficit  psych: normal affect    WBC Count: 6.17 K/uL (02-27 @ 07:50)  WBC Count: 6.49 K/uL (02-26 @ 23:45)  WBC Count: 7.02 K/uL (02-26 @ 08:30)  WBC Count: 6.77 K/uL (02-25 @ 08:00)  WBC Count: 7.75 K/uL (02-25 @ 03:53)  WBC Count: 8.09 K/uL (02-24 @ 13:49)                            10.5   6.17  )-----------( 329      ( 27 Feb 2025 07:50 )             34.6       02-26    139  |  109[H]  |  5[L]  ----------------------------<  132[H]  3.4[L]   |  27  |  0.65    Ca    8.8      26 Feb 2025 08:30        Urinalysis Basic - ( 26 Feb 2025 08:30 )    Color: x / Appearance: x / SG: x / pH: x  Gluc: 132 mg/dL / Ketone: x  / Bili: x / Urobili: x   Blood: x / Protein: x / Nitrite: x   Leuk Esterase: x / RBC: x / WBC x   Sq Epi: x / Non Sq Epi: x / Bacteria: x        Lipase: 30 U/L (02-24-25 @ 13:49)    Creatinine Trend: 0.65<--, 0.54<--, 0.57<--      MICROBIOLOGY:  v  .Blood Blood-Peripheral  02-24-25   No growth at 48 Hours  --  --    RADIOLOGY:

## 2025-02-27 NOTE — PROGRESS NOTE ADULT - ASSESSMENT
54 year old F PMH HTN, DVT/PE on xarelto p/w abd pain with associated diarrhea found to have diverticulitis with fluid collection on imaging. Also with DVT and PE.     # Sigmoid diverticulitis with fluid collection   Recent colonoscopy 3-4 weeks ago outpatient, reported normal   CT A/P w/ contrast with acute complicated sigmoid diverticulitis with adjacent fluid and air collection.  UA neg for infxn, blood cultures ordered   Started on IV abx, continue with zosyn   Evaluated by surgery, recommended IR evaluation however, per IR no appropriate window to drain.  ID consulted for antibiotic management.  Clinically improving on IV Abx.  Change to po Augmentin x 14d discussed both with ID and surgery.  Pt will need a repeat CT A/P in next several weeks to ensure resolution of her intraabdominal abscess.  Outpatient GI followup also discussed.  pt acknowledged understanding.      # PE/DVT   History of DVT and PE in 2019 which was treated  Again had DVT in dec 2024 for which she was started on xarelto  Unclear if she had PE at the time, however PE seen on CTA on admission here  Cannot determine if this is old vs new/if failed xarelto or not  TTE not suggestive of R heart strain.  Pulmonary input requested.  I consulted Tele - Hematology - discussed with Dr. Mcclellan.  Recommendations appreciated  Eliquis.      # HTN Home med metoprolol succ 50mg QD  # Anemia  States h/o iron def anemia, was on iron pills, no longer taking  Monitor CBC   # Alcohol Use - no s/s of withdrawal.  Discussed cessation.  Pt reports drinking ~ 4 alcoholic beverages daily.    # Inpatient DVT Proph - on anticoagulation       Discharge d/w ID, surgery, patient.  54 year old F PMH HTN, DVT/PE on xarelto p/w abd pain with associated diarrhea found to have diverticulitis with fluid collection on imaging. Also with DVT and PE.     # Sigmoid diverticulitis with fluid collection   Recent colonoscopy 3-4 weeks ago outpatient, reported normal   CT A/P w/ contrast with acute complicated sigmoid diverticulitis with adjacent fluid and air collection.  UA neg for infxn, blood cultures ordered   Started on IV abx, continue with zosyn   Evaluated by surgery, recommended IR evaluation however, per IR no appropriate window to drain.  ID consulted for antibiotic management.  Clinically improving on IV Abx.  Change to po Augmentin x 14d discussed both with ID and surgery.  Pt will need a repeat CT A/P in next several weeks to ensure resolution of her intraabdominal abscess.  Outpatient GI followup also discussed.  pt acknowledged understanding.      # PE/DVT   History of DVT and PE in 2019 which was treated  Again had DVT in dec 2024 for which she was started on xarelto  Unclear if she had PE at the time, however PE seen on CTA on admission here  Cannot determine if this is old vs new/if failed xarelto or not  TTE not suggestive of R heart strain.  Pulmonary input requested.  I consulted Tele - Hematology - discussed with Dr. Mcclellan.  Recommendations appreciated  Eliquis.    Emphasized outpatient f/u with hematology for hypercoagulable workup and malignancy screening.  Pt acknowledged understanding.     # HTN Home med metoprolol succ 50mg QD  # Anemia  States h/o iron def anemia, was on iron pills, no longer taking  Monitor CBC   # Alcohol Use - no s/s of withdrawal.  Discussed cessation.  Pt reports drinking ~ 4 alcoholic beverages daily.    # Inpatient DVT Proph - on anticoagulation       Discharge d/w ID, surgery, patient.   Disposition: Stable for discharge.  Outpatient followup discussed.  Total time spent on discharge is  35  minutes.a

## 2025-02-27 NOTE — DISCHARGE NOTE PROVIDER - PROVIDER TOKENS
PROVIDER:[TOKEN:[[id number]]],PROVIDER:[TOKEN:[[id number]]],PROVIDER:[TOKEN:[[id number]]],FREE:[LAST:[GI],PHONE:[(   )    -],FAX:[(   )    -]]

## 2025-02-27 NOTE — PROGRESS NOTE ADULT - SUBJECTIVE AND OBJECTIVE BOX
INTERVAL HPI/OVERNIGHT EVENTS: No acute events overnight. Afebrile. Remains HD stable on RA.     SUBJECTIVE: Patient seen and examined at bedside.     ROS: All negative except as listed above.    VITAL SIGNS:  ICU Vital Signs Last 24 Hrs  T(C): 36.8 (27 Feb 2025 04:38), Max: 37 (26 Feb 2025 17:07)  T(F): 98.2 (27 Feb 2025 04:38), Max: 98.6 (26 Feb 2025 17:07)  HR: 62 (27 Feb 2025 04:38) (62 - 75)  BP: 138/84 (27 Feb 2025 04:38) (116/75 - 151/89)  BP(mean): --  ABP: --  ABP(mean): --  RR: 18 (27 Feb 2025 04:38) (18 - 19)  SpO2: 95% (27 Feb 2025 04:38) (94% - 100%)    O2 Parameters below as of 27 Feb 2025 04:38  Patient On (Oxygen Delivery Method): room air            Plateau pressure:   P/F ratio:     02-26 @ 07:01  -  02-27 @ 07:00  --------------------------------------------------------  IN: 1187 mL / OUT: 0 mL / NET: 1187 mL      CAPILLARY BLOOD GLUCOSE          ECG: reviewed.    PHYSICAL EXAM:    General: well, no distress   HEENT: Sclera clear, EOMI   Neck: supple no JVD  Respiratory: CTA B/L no wheezing Cough stridor  Cardiovascular: S1S2 RRR  Abdomen: soft + BS   Extremities: no edema, WAYNE   Skin: no rash / breakdown  Neurological: no focal deficits   Psychiatry: appropriate     MEDICATIONS:  MEDICATIONS  (STANDING):  cefepime   IVPB 2000 milliGRAM(s) IV Intermittent every 8 hours  heparin  Infusion. 1600 Unit(s)/Hr (16 mL/Hr) IV Continuous <Continuous>  metoprolol succinate ER 50 milliGRAM(s) Oral daily  metroNIDAZOLE    Tablet 500 milliGRAM(s) Oral every 12 hours  naloxone Injectable 0.4 milliGRAM(s) IV Push once  polyethylene glycol 3350 17 Gram(s) Oral daily  senna 2 Tablet(s) Oral at bedtime    MEDICATIONS  (PRN):  acetaminophen     Tablet .. 650 milliGRAM(s) Oral every 6 hours PRN Temp greater or equal to 38C (100.4F), Mild Pain (1 - 3)  aluminum hydroxide/magnesium hydroxide/simethicone Suspension 30 milliLiter(s) Oral every 4 hours PRN Dyspepsia  bisacodyl 5 milliGRAM(s) Oral daily PRN Constipation  famotidine    Tablet 20 milliGRAM(s) Oral daily PRN heartburn  heparin   Injectable 6500 Unit(s) IV Push every 6 hours PRN For aPTT less than 40  heparin   Injectable 3000 Unit(s) IV Push every 6 hours PRN For aPTT between 40 - 57  melatonin 3 milliGRAM(s) Oral at bedtime PRN Insomnia  ondansetron Injectable 4 milliGRAM(s) IV Push every 8 hours PRN Nausea and/or Vomiting  oxyCODONE    IR 5 milliGRAM(s) Oral every 4 hours PRN Moderate Pain (4 - 6)  oxyCODONE    IR 10 milliGRAM(s) Oral every 4 hours PRN Severe Pain (7 - 10)      ALLERGIES:  Allergies    No Known Allergies    Intolerances        LABS:                        10.5   6.17  )-----------( 329      ( 27 Feb 2025 07:50 )             34.6     02-26    139  |  109[H]  |  5[L]  ----------------------------<  132[H]  3.4[L]   |  27  |  0.65    Ca    8.8      26 Feb 2025 08:30      PT/INR - ( 26 Feb 2025 08:30 )   PT: 12.6 sec;   INR: 1.12 ratio         PTT - ( 26 Feb 2025 23:45 )  PTT:88.5 sec  Urinalysis Basic - ( 26 Feb 2025 08:30 )    Color: x / Appearance: x / SG: x / pH: x  Gluc: 132 mg/dL / Ketone: x  / Bili: x / Urobili: x   Blood: x / Protein: x / Nitrite: x   Leuk Esterase: x / RBC: x / WBC x   Sq Epi: x / Non Sq Epi: x / Bacteria: x      ABG:      vBG:    Micro:    Culture - Blood (collected 02-24-25 @ 20:00)  Source: .Blood Blood-Peripheral  Preliminary Report (02-26-25 @ 10:01):    No growth at 24 hours    Culture - Blood (collected 02-24-25 @ 20:00)  Source: .Blood Blood-Peripheral  Preliminary Report (02-26-25 @ 10:01):    No growth at 24 hours       Urinalysis with Rflx Culture (collected 02-24-25 @ 16:16)         RADIOLOGY & ADDITIONAL TESTS: Reviewed.

## 2025-02-27 NOTE — DISCHARGE NOTE NURSING/CASE MANAGEMENT/SOCIAL WORK - PATIENT PORTAL LINK FT
You can access the FollowMyHealth Patient Portal offered by [facility] by registering at the following website: [email or web address]. By joining [employer]’s FollowMyHealth portal, you will also be able to view your health information using other applications (apps) compatible with our system.

## 2025-02-27 NOTE — DISCHARGE NOTE NURSING/CASE MANAGEMENT/SOCIAL WORK - FINANCIAL ASSISTANCE
[facility] provides services at a reduced cost to those who are determined to be eligible through [facility]’s financial assistance program. Information regarding [facility]’s financial assistance program can be found by going to [email or web address] or by calling [phone].

## 2025-02-27 NOTE — DISCHARGE NOTE PROVIDER - CARE PROVIDER_API CALL
Shayna Sifuentes  Surgery  [address] [place] [address]  Phone: [phone]  Fax: [phone]  Follow Up Time:     Igor Mcclellan  [address] [address]  Phone: [phone]  Fax: [phone]  Follow Up Time:     Tim Arredondo  Infectious Disease  [address] [place] [address]  Phone: [phone]  Fax: [phone]  Follow Up Time:     GI,   Phone: (   )    -  Fax: (   )    -  Follow Up Time:

## 2025-02-27 NOTE — DISCHARGE NOTE PROVIDER - NSDCFUSCHEDAPPT_GEN_ALL_CORE_FT
[facility]  CARDIOLOGY [address]  Scheduled Appointment: 03/06/2025    [place]  [facility]  ONCPAINMGT [address]  Scheduled Appointment: 03/07/2025    Patrick Rodriguez  [facility]  CARDIOLOGY [address]  Scheduled Appointment: 03/20/2025    Kasi Christensen  [facility]  ORTHOSURG [address]  Scheduled Appointment: 05/09/2025     Patrick Rodriguez  [facility]  CARDIOLOGY [address]  Scheduled Appointment: 03/20/2025    [place]  [facility]  ONCPAINMGT [address]  Scheduled Appointment: 04/04/2025    Kasi Christensen  [facility]  ORTHOSURG [address]  Scheduled Appointment: 05/09/2025    Emelina Winter  [facility]  [facility]  Scheduled Appointment: 05/29/2025     [place]  [facility]  ONCPAINMGT [address]  Scheduled Appointment: 04/04/2025    Benita King  [facility]  GASTRO [address]  Scheduled Appointment: 04/17/2025    Tim Arredondo  [facility]  INFDISEASE [address]  Scheduled Appointment: 04/24/2025    Patrick Rodriguez  [facility]  CARDIOLOGY [address]  Scheduled Appointment: 05/08/2025    Kasi Christensen  [facility]  ORTHOSURG [address]  Scheduled Appointment: 05/09/2025    Emelina Winter  [facility]  [facility]  Scheduled Appointment: 05/29/2025

## 2025-02-27 NOTE — DISCHARGE NOTE PROVIDER - NSDCQMSTROKE_NEU_ALL_CORE
Left message for patient to call back.  Patient is able to proceed in scheduling Kidney transplant evaluation with Dr. Melissa at this time.  
No

## 2025-02-27 NOTE — PROGRESS NOTE ADULT - ASSESSMENT
54 year old F PMH HTN, DVT/PE on xarelto p/w abd pain with associated diarrhea found to have diverticulitis with fluid collection on imaging. Also with DVT and PE. Pulmonology consulted for further management.     Recs:     - Pt oxygenating well on RA. Continue to maintain goal Spo2 >92%  - Per patient she was first dx in 2019 with b/l PEs and b/l DVTs had extensive @ [facility] suspected secondary to smoking hx of 3 pks ppd for 30 yrs was then placed on Xarelto  - In January 2024, the Xarelto was held for 2 days for colonoscopy and then she had worsening leg pain which prompted her to ED founding new DVT in the setting of stopping for 2 days. Now has been compliant with Xarelto and hasn't missed a dose. She has had no recent travel and has quit smoking since 2019   - CTA showing: Intraluminal filling defects in segmental/subsegmental branches of pulmonary artery in right lower lobe, suggestive of pulmonary embolism. 3.0 x 3.0 pericardial cyst in right cardiophrenic angle.  - Pt asx from PE on CT.   - negative echo for phtn and rv dysfxn  - v/q negative for PE (low probability) but may represent clinically insignificant clot  - d/w nuclear and ct radiology regarding the discordance between v/q and CTA. PE definitely present on CT chest. However may not be clinically significant as perfusion negative  - f/u hypercoagable w/u genetic testing  - needs age appropriate cancer screening.   - Continue heparin gtt per nomogram   - Heme- Onc consult would be appreciated  - needs malignancy work up; recent colonscopy would recommend mammogram and pap smear   - Rest of care per primary team    NOTE INCOMPLETE  54 year old F PMH HTN, DVT/PE on xarelto p/w abd pain with associated diarrhea found to have diverticulitis with fluid collection on imaging. Also with DVT and PE. Pulmonology consulted for further management.     Recs:     - Pt oxygenating well on RA. Continue to maintain goal Spo2 >92%  - Per patient she was first dx in 2019 with b/l PEs and b/l DVTs had extensive @ [facility] suspected secondary to smoking hx of 3 pks ppd for 30 yrs was then placed on Xarelto  - In January 2024, the Xarelto was held for 2 days for colonoscopy and then she had worsening leg pain which prompted her to ED founding new DVT in the setting of stopping for 2 days. Now has been compliant with Xarelto and hasn't missed a dose. She has had no recent travel and has quit smoking since 2019   - CTA showing: Intraluminal filling defects in segmental/subsegmental branches of pulmonary artery in right lower lobe, suggestive of pulmonary embolism. 3.0 x 3.0 pericardial cyst in right cardiophrenic angle.  - Pt asx from PE on CT.   - negative echo for phtn and rv dysfxn  - v/q negative for PE (low probability) but may represent clinically insignificant clot  - d/w nuclear and ct radiology regarding the discordance between v/q and CTA. PE definitely present on CT chest. However may not be clinically significant as perfusion negative  - f/u hypercoagulable w/u genetic testing  - needs age appropriate cancer screening.   - Continue heparin gtt per nomogram   - Heme- Onc consult would be appreciated  - needs malignancy work up; recent colonoscopy would recommend mammogram and pap smear   - Rest of care per primary team    Case discussed with Dr. Dorantes

## 2025-02-27 NOTE — DISCHARGE NOTE PROVIDER - CARE PROVIDERS DIRECT ADDRESSES
[email or web address]'s [email or web address]

## 2025-02-27 NOTE — DISCHARGE NOTE PROVIDER - NSDCFUADDINST_GEN_ALL_CORE_FT
It is important to see your primary physician as well as any specialty physicians within the next week to perform a comprehensive medical review.  Call their offices for an appointment as soon as you leave the hospital.  You will also need to see them for renewal of your medications.  If have any difficulty following with a physician, contact the [facility] [phone] or via [email or web address].   To obtain your results, you can access the "[employer] Patient Portal at [email or web address].  Your medical issues appear to be stable at this time, but if your symptoms recur or worsen, contact your physicians and/or return to the hospital if necessary.  If you encounter any issues or questions with your medication, call your physicians before stopping the medication.  Do not drive.  Limit your diet to 2 grams of sodium daily.

## 2025-02-27 NOTE — PROGRESS NOTE ADULT - SUBJECTIVE AND OBJECTIVE BOX
Patient: BETTY NAVARRO [id number] 54y Female                            Hospitalist Attending Note    No complaints.  No chest pain / palpitations. No SOB.    No bleeding.  No leg pain.    Requesting d/c home.    ____________________PHYSICAL EXAM:  GENERAL:  NAD Alert and Oriented x 3   HEENT: NCAT  CARDIOVASCULAR:  S1, S2  LUNGS: CTAB  ABDOMEN:  soft, (-) tenderness, (-) distension, (+) bowel sounds, (-) guarding, (-) rebound (-) rigidity  EXTREMITIES:  no cyanosis / clubbing.  + LLE edema.   ____________________      VITALS:  Vital Signs Last 24 Hrs  T(C): 36.7 (27 Feb 2025 11:09), Max: 37 (26 Feb 2025 17:07)  T(F): 98.1 (27 Feb 2025 11:09), Max: 98.6 (26 Feb 2025 17:07)  HR: 77 (27 Feb 2025 11:09) (62 - 77)  BP: 116/83 (27 Feb 2025 11:09) (116/83 - 151/89)  BP(mean): --  RR: 16 (27 Feb 2025 11:09) (16 - 18)  SpO2: 97% (27 Feb 2025 11:09) (94% - 100%)    Parameters below as of 27 Feb 2025 11:09  Patient On (Oxygen Delivery Method): room air     Daily     Daily   CAPILLARY BLOOD GLUCOSE        I&O's Summary    26 Feb 2025 07:01  -  27 Feb 2025 07:00  --------------------------------------------------------  IN: 1187 mL / OUT: 0 mL / NET: 1187 mL    27 Feb 2025 07:01  -  27 Feb 2025 16:58  --------------------------------------------------------  IN: 550 mL / OUT: 0 mL / NET: 550 mL        LABS:                        10.5   6.17  )-----------( 329      ( 27 Feb 2025 07:50 )             34.6     02-26    139  |  109[H]  |  5[L]  ----------------------------<  132[H]  3.4[L]   |  27  |  0.65    Ca    8.8      26 Feb 2025 08:30      PT/INR - ( 26 Feb 2025 08:30 )   PT: 12.6 sec;   INR: 1.12 ratio         PTT - ( 27 Feb 2025 07:50 )  PTT:66.9 sec    Urinalysis Basic - ( 26 Feb 2025 08:30 )    Color: x / Appearance: x / SG: x / pH: x  Gluc: 132 mg/dL / Ketone: x  / Bili: x / Urobili: x   Blood: x / Protein: x / Nitrite: x   Leuk Esterase: x / RBC: x / WBC x   Sq Epi: x / Non Sq Epi: x / Bacteria: x            Culture - Blood (collected 24 Feb 2025 20:00)  Source: .Blood Blood-Peripheral  Preliminary Report (27 Feb 2025 10:01):    No growth at 48 Hours    Culture - Blood (collected 24 Feb 2025 20:00)  Source: .Blood Blood-Peripheral  Preliminary Report (27 Feb 2025 10:01):    No growth at 48 Hours        MEDICATIONS:  acetaminophen     Tablet .. 650 milliGRAM(s) Oral every 6 hours PRN  aluminum hydroxide/magnesium hydroxide/simethicone Suspension 30 milliLiter(s) Oral every 4 hours PRN  apixaban 5 milliGRAM(s) Oral every 12 hours  bisacodyl 5 milliGRAM(s) Oral daily PRN  cefepime   IVPB 2000 milliGRAM(s) IV Intermittent every 8 hours  famotidine    Tablet 20 milliGRAM(s) Oral daily PRN  melatonin 3 milliGRAM(s) Oral at bedtime PRN  metoprolol succinate ER 50 milliGRAM(s) Oral daily  metroNIDAZOLE    Tablet 500 milliGRAM(s) Oral every 12 hours  naloxone Injectable 0.4 milliGRAM(s) IV Push once  ondansetron Injectable 4 milliGRAM(s) IV Push every 8 hours PRN  oxyCODONE    IR 5 milliGRAM(s) Oral every 4 hours PRN  oxyCODONE    IR 10 milliGRAM(s) Oral every 4 hours PRN  polyethylene glycol 3350 17 Gram(s) Oral daily  senna 2 Tablet(s) Oral at bedtime

## 2025-02-27 NOTE — CHART NOTE - NSCHARTNOTEFT_GEN_A_CORE
To Whom It May Concern:    This patient was hospitalized at [facility] for a serious medical condition.  She was advised not to return to work until further evaluation by MD.    Date of Hospitalization:  2/24/25-2/27/25      Please feel free to contact me with any questions.       Sincerely,    <Electronically signed>     Dr. Rikki Turner  Attending Hospitalist  [phone]
Interventional Radiology    53 yo F PMHx of HTN on metoprolol, PE/DVT 2019, and recently dx with LLE DVT on xarelto p/w abd pain with associated diarrhea. Patient was given Augmentin from PCP for possible diverticulitis but symptoms continued. CT a/p with acute sigmoid diverticulitis with adjacent 2x3cm fluid and air collection. Pt also with PE started on hep gtt. IR consulted for abdominal abscess drainage.     Case and imaging reviewed with Dr. Rosas. No window for percutaneous drainage of abscess. Recommend continued conservative mgmt, as per primary team/surgery.   No immediate IR intervention, may reconsult PRN.     Cynthia Cantu PA-C   Interventional Radiology   Available on TEAMs

## 2025-02-27 NOTE — DISCHARGE NOTE PROVIDER - NSDCMRMEDTOKEN_GEN_ALL_CORE_FT
amoxicillin-clavulanate 875 mg-125 mg oral tablet: 1 tab(s) orally 2 times a day  apixaban 5 mg oral tablet: 1 tab(s) orally every 12 hours   amoxicillin-clavulanate 875 mg-125 mg oral tablet: 1 tab(s) orally 2 times a day  apixaban 5 mg oral tablet: 1 tab(s) orally every 12 hours  Metoprolol Succinate ER 25 mg oral tablet, extended release: 1 tab(s) orally once a day

## 2025-02-27 NOTE — DISCHARGE NOTE PROVIDER - NSDCFUADDAPPT_GEN_ALL_CORE_FT
APPTS ARE READY TO BE MADE: [X] YES    Best Family or Patient Contact (if needed):    Additional Information about above appointments (if needed):    During your hospitalization, you had abnormal findings on radiologic / laboratory studies.  Please see your primary doctor for followup of these findings to ensure that they have resolved.  You may require further evaluation to exclude certain serious conditions, and / or treatment.  APPTS ARE READY TO BE MADE: [X] YES    Best Family or Patient Contact (if needed):    Additional Information about above appointments (if needed):    During your hospitalization, you had abnormal findings on radiologic / laboratory studies.  Please see your primary doctor for followup of these findings to ensure that they have resolved.  You may require further evaluation to exclude certain serious conditions, and / or treatment.     Prior to outreaching the patient, it was visible that the patient has secured a follow up appointment which was not scheduled by our team. Patient is scheduled 2/26 at 3PM at [address] [place] [address] with Dr. Igor Mcclellan       Patient was provided with our contact number and will return our call if scheduling assistance is needed.  APPTS ARE READY TO BE MADE: [X] YES    Best Family or Patient Contact (if needed):    Additional Information about above appointments (if needed):    During your hospitalization, you had abnormal findings on radiologic / laboratory studies.  Please see your primary doctor for followup of these findings to ensure that they have resolved.  You may require further evaluation to exclude certain serious conditions, and / or treatment.     Prior to outreaching the patient, it was visible that the patient has secured a follow up appointment which was not scheduled by our team. Patient is scheduled 2/26 at 3PM at [address] [facility] with Dr. Igor Mcclellan         Provider's office was contacted to secure an appointment, however the office will follow up with the patient/caregiver directly. Dr. Sifuentes's office number only allows you to leave a message for a call back and does not directly connect to office, message was left asking them to call you back to schedule.     Appointment scheduled in [facility] for 4/17 at 2PM at [address] [place] [address] with Dr. Benita King. Patient requested appointment in April on a Thursday/Friday and this was the closest office with availability on those days.    Appointment was scheduled by our team on the patient's behalf through the provider's office. Appointment is on 4/24 at 1:30PM at [address] [place] [address] with Dr. Tim Manuel. Provider does not see patients in [place] so appointment has to be in [place] office.

## 2025-02-27 NOTE — PROGRESS NOTE ADULT - PROVIDER SPECIALTY LIST ADULT
Infectious Disease
Pulmonology
Hospitalist
Infectious Disease
Surgery
Surgery
Pulmonology
Hospitalist
Hospitalist
Surgery

## 2025-02-27 NOTE — PROGRESS NOTE ADULT - ASSESSMENT
54F w/ PMHx lumbar laminectomy admitted with sigmoid diverticulitis with a collection.  IR evaluated and no window for drainage.    - Continue Abx per ID, No planned surgical intervention  - Should be on a Low residue diet  - Continue care per primary team  Discussed with Dr Sifuetnes

## 2025-02-27 NOTE — DISCHARGE NOTE PROVIDER - NSDCCPCAREPLAN_GEN_ALL_CORE_FT
PRINCIPAL DISCHARGE DIAGNOSIS  Diagnosis: Diverticulitis of large intestine with abscess without bleeding  Assessment and Plan of Treatment:       SECONDARY DISCHARGE DIAGNOSES  Diagnosis: Acute pulmonary embolism  Assessment and Plan of Treatment:     Diagnosis: DVT, lower extremity  Assessment and Plan of Treatment:

## 2025-02-27 NOTE — DISCHARGE NOTE PROVIDER - HOSPITAL COURSE
54 year old F PMH HTN, DVT/PE on xarelto p/w abd pain with associated diarrhea found to have diverticulitis with fluid collection on imaging. Also with DVT and PE.     # Sigmoid diverticulitis with fluid collection   Recent colonoscopy 3-4 weeks ago outpatient, reported normal   CT A/P w/ contrast with acute complicated sigmoid diverticulitis with adjacent fluid and air collection.  UA neg for infxn, blood cultures ordered   Started on IV abx, continue with zosyn   Evaluated by surgery, recommended IR evaluation however, per IR no appropriate window to drain.  ID consulted for antibiotic management.  Clinically improving on IV Abx.  Change to po Augmentin x 14d discussed both with ID and surgery.  Pt will need a repeat CT A/P in next several weeks to ensure resolution of her intraabdominal abscess.  Outpatient GI followup also discussed.  pt acknowledged understanding.      # PE/DVT   History of DVT and PE in 2019 which was treated  Again had DVT in dec 2024 for which she was started on xarelto  Unclear if she had PE at the time, however PE seen on CTA on admission here  Cannot determine if this is old vs new/if failed xarelto or not  TTE not suggestive of R heart strain.  Pulmonary input requested.  I consulted Tele - Hematology - discussed with Dr. Mcclellan.  Recommendations appreciated  Eliquis.    Emphasized outpatient f/u with hematology for hypercoagulable workup and malignancy screening.  Pt acknowledged understanding.     # HTN Home med metoprolol succ 50mg QD  # Anemia  States h/o iron def anemia, was on iron pills, no longer taking  Monitor CBC   # Alcohol Use - no s/s of withdrawal.  Discussed cessation.  Pt reports drinking ~ 4 alcoholic beverages daily.    # Inpatient DVT Proph - on anticoagulation       Discharge d/w ID, surgery, patient.   Disposition: Stable for discharge.  Outpatient followup discussed.  Total time spent on discharge is  35  minutes.
Yes

## 2025-02-27 NOTE — PROGRESS NOTE ADULT - SUBJECTIVE AND OBJECTIVE BOX
Surgery NP note  Patient seen and examined bedside resting comfortably.  No complaints offered. Denies Abdominal pain  Denies nausea and vomiting. Tolerating regular diet.  Normal flatus/BM.     T(F): 98.1 (02-27-25 @ 11:09), Max: 98.6 (02-26-25 @ 17:07)  HR: 77 (02-27-25 @ 11:09) (62 - 77)  BP: 116/83 (02-27-25 @ 11:09) (116/83 - 151/89)  RR: 16 (02-27-25 @ 11:09) (16 - 18)  SpO2: 97% (02-27-25 @ 11:09) (94% - 100%)  Wt(kg): --  CAPILLARY BLOOD GLUCOSE      PHYSICAL EXAM:  General: NAD, WDWN.   Neuro:  Alert & responsive  HEENT: NCAT, EOMI, conjunctiva clear  CV: +S1+S2 regular rate and rhythm  Lung: clear to ausculation bilaterally, respirations nonlabored, good inspiratory effort  Abdomen: soft, Non tender, No Distension. Normal active BS  Extremities: no pedal edema or calf tenderness noted     LABS:                        10.5   6.17  )-----------( 329      ( 27 Feb 2025 07:50 )             34.6     02-26    139  |  109[H]  |  5[L]  ----------------------------<  132[H]  3.4[L]   |  27  |  0.65    Ca    8.8      26 Feb 2025 08:30        PT/INR - ( 26 Feb 2025 08:30 )   PT: 12.6 sec;   INR: 1.12 ratio         PTT - ( 27 Feb 2025 07:50 )  PTT:66.9 sec  I&O's Detail    26 Feb 2025 07:01  -  27 Feb 2025 07:00  --------------------------------------------------------  IN:    Oral Fluid: 1187 mL  Total IN: 1187 mL    OUT:  Total OUT: 0 mL    Total NET: 1187 mL      27 Feb 2025 07:01  -  27 Feb 2025 15:54  --------------------------------------------------------  IN:    Oral Fluid: 550 mL  Total IN: 550 mL    OUT:  Total OUT: 0 mL    Total NET: 550 mL            Impression:

## 2025-02-27 NOTE — DISCHARGE NOTE NURSING/CASE MANAGEMENT/SOCIAL WORK - NSDCFUADDAPPT_GEN_ALL_CORE_FT
APPTS ARE READY TO BE MADE: [X] YES    Best Family or Patient Contact (if needed):    Additional Information about above appointments (if needed):    During your hospitalization, you had abnormal findings on radiologic / laboratory studies.  Please see your primary doctor for followup of these findings to ensure that they have resolved.  You may require further evaluation to exclude certain serious conditions, and / or treatment.

## 2025-02-28 LAB
APCR PPP: 2.93 RATIO — SIGNIFICANT CHANGE UP
B2 GLYCOPROT1 IGA SER QL: <2 U/ML — SIGNIFICANT CHANGE UP
B2 GLYCOPROT1 IGA SER QL: <2 U/ML — SIGNIFICANT CHANGE UP
B2 GLYCOPROT1 IGG SER-ACNC: <1.4 U/ML — SIGNIFICANT CHANGE UP
B2 GLYCOPROT1 IGG SER-ACNC: <1.4 U/ML — SIGNIFICANT CHANGE UP
B2 GLYCOPROT1 IGM SER-ACNC: <1.5 U/ML — SIGNIFICANT CHANGE UP
B2 GLYCOPROT1 IGM SER-ACNC: <1.5 U/ML — SIGNIFICANT CHANGE UP
CARDIOLIPIN IGM SER-MCNC: <1.5 MPL U/ML — SIGNIFICANT CHANGE UP
CARDIOLIPIN IGM SER-MCNC: <1.5 MPL U/ML — SIGNIFICANT CHANGE UP
CARDIOLIPIN IGM SER-MCNC: <1.6 GPL U/ML — SIGNIFICANT CHANGE UP
CARDIOLIPIN IGM SER-MCNC: <1.6 GPL U/ML — SIGNIFICANT CHANGE UP
DEPRECATED CARDIOLIPIN IGA SER: <2 APL U/ML — SIGNIFICANT CHANGE UP
DEPRECATED CARDIOLIPIN IGA SER: <2 APL U/ML — SIGNIFICANT CHANGE UP

## 2025-03-02 LAB
CULTURE RESULTS: SIGNIFICANT CHANGE UP
CULTURE RESULTS: SIGNIFICANT CHANGE UP
SPECIMEN SOURCE: SIGNIFICANT CHANGE UP
SPECIMEN SOURCE: SIGNIFICANT CHANGE UP

## 2025-03-03 DIAGNOSIS — Z87.891 PERSONAL HISTORY OF NICOTINE DEPENDENCE: ICD-10-CM

## 2025-03-03 DIAGNOSIS — Z86.718 PERSONAL HISTORY OF OTHER VENOUS THROMBOSIS AND EMBOLISM: ICD-10-CM

## 2025-03-03 DIAGNOSIS — Z79.01 LONG TERM (CURRENT) USE OF ANTICOAGULANTS: ICD-10-CM

## 2025-03-03 DIAGNOSIS — K57.32 DIVERTICULITIS OF LARGE INTESTINE WITHOUT PERFORATION OR ABSCESS WITHOUT BLEEDING: ICD-10-CM

## 2025-03-03 DIAGNOSIS — D64.9 ANEMIA, UNSPECIFIED: ICD-10-CM

## 2025-03-03 DIAGNOSIS — I26.99 OTHER PULMONARY EMBOLISM WITHOUT ACUTE COR PULMONALE: ICD-10-CM

## 2025-03-03 DIAGNOSIS — Z86.711 PERSONAL HISTORY OF PULMONARY EMBOLISM: ICD-10-CM

## 2025-03-03 DIAGNOSIS — I10 ESSENTIAL (PRIMARY) HYPERTENSION: ICD-10-CM

## 2025-03-03 LAB
DNA PLOIDY SPEC FC-IMP: SIGNIFICANT CHANGE UP
PTR INTERPRETATION: SIGNIFICANT CHANGE UP

## 2025-03-06 ENCOUNTER — APPOINTMENT (OUTPATIENT)
Dept: CARDIOLOGY | Facility: CLINIC | Age: 55
End: 2025-03-06

## 2025-03-07 ENCOUNTER — APPOINTMENT (OUTPATIENT)
Dept: PAIN MANAGEMENT | Facility: CLINIC | Age: 55
End: 2025-03-07
Payer: COMMERCIAL

## 2025-03-07 DIAGNOSIS — M96.1 POSTLAMINECTOMY SYNDROME, NOT ELSEWHERE CLASSIFIED: ICD-10-CM

## 2025-03-07 PROCEDURE — 99213 OFFICE O/P EST LOW 20 MIN: CPT

## 2025-03-20 DIAGNOSIS — I26.99 OTHER PULMONARY EMBOLISM W/OUT ACUTE COR PULMONALE: ICD-10-CM

## 2025-03-20 RX ORDER — APIXABAN 5 MG/1
5 TABLET, FILM COATED ORAL
Qty: 180 | Refills: 1 | Status: ACTIVE | COMMUNITY
Start: 2025-03-20 | End: 1900-01-01

## 2025-04-03 ENCOUNTER — APPOINTMENT (OUTPATIENT)
Dept: ORTHOPEDIC SURGERY | Facility: CLINIC | Age: 55
End: 2025-04-03

## 2025-04-03 VITALS — WEIGHT: 170 LBS | BODY MASS INDEX: 27.32 KG/M2 | HEIGHT: 66 IN

## 2025-04-03 DIAGNOSIS — M17.12 UNILATERAL PRIMARY OSTEOARTHRITIS, LEFT KNEE: ICD-10-CM

## 2025-04-03 DIAGNOSIS — M25.512 PAIN IN LEFT SHOULDER: ICD-10-CM

## 2025-04-03 DIAGNOSIS — Z86.718 PERSONAL HISTORY OF OTHER VENOUS THROMBOSIS AND EMBOLISM: ICD-10-CM

## 2025-04-03 DIAGNOSIS — M75.112 INCOMPLETE ROTATOR CUFF TEAR OR RUPTURE OF LEFT SHOULDER, NOT SPECIFIED AS TRAUMATIC: ICD-10-CM

## 2025-04-03 DIAGNOSIS — I10 ESSENTIAL (PRIMARY) HYPERTENSION: ICD-10-CM

## 2025-04-03 DIAGNOSIS — M25.462 EFFUSION, LEFT KNEE: ICD-10-CM

## 2025-04-03 DIAGNOSIS — M75.42 IMPINGEMENT SYNDROME OF LEFT SHOULDER: ICD-10-CM

## 2025-04-03 PROCEDURE — 99212 OFFICE O/P EST SF 10 MIN: CPT | Mod: 25

## 2025-04-03 PROCEDURE — J3490M: CUSTOM

## 2025-04-03 PROCEDURE — 20611 DRAIN/INJ JOINT/BURSA W/US: CPT | Mod: LT

## 2025-04-03 PROCEDURE — 73030 X-RAY EXAM OF SHOULDER: CPT | Mod: LT

## 2025-04-04 ENCOUNTER — APPOINTMENT (OUTPATIENT)
Dept: PAIN MANAGEMENT | Facility: CLINIC | Age: 55
End: 2025-04-04
Payer: COMMERCIAL

## 2025-04-04 DIAGNOSIS — M54.16 RADICULOPATHY, LUMBAR REGION: ICD-10-CM

## 2025-04-04 PROCEDURE — 99213 OFFICE O/P EST LOW 20 MIN: CPT | Mod: 95

## 2025-04-05 ENCOUNTER — APPOINTMENT (OUTPATIENT)
Dept: MRI IMAGING | Facility: CLINIC | Age: 55
End: 2025-04-05
Payer: COMMERCIAL

## 2025-04-05 PROCEDURE — 73221 MRI JOINT UPR EXTREM W/O DYE: CPT | Mod: LT

## 2025-04-18 ENCOUNTER — APPOINTMENT (OUTPATIENT)
Dept: ORTHOPEDIC SURGERY | Facility: CLINIC | Age: 55
End: 2025-04-18

## 2025-04-18 DIAGNOSIS — M75.112 INCOMPLETE ROTATOR CUFF TEAR OR RUPTURE OF LEFT SHOULDER, NOT SPECIFIED AS TRAUMATIC: ICD-10-CM

## 2025-04-18 DIAGNOSIS — M25.462 EFFUSION, LEFT KNEE: ICD-10-CM

## 2025-04-18 DIAGNOSIS — M75.42 IMPINGEMENT SYNDROME OF LEFT SHOULDER: ICD-10-CM

## 2025-04-18 PROCEDURE — 20611 DRAIN/INJ JOINT/BURSA W/US: CPT | Mod: LT

## 2025-04-18 PROCEDURE — 73030 X-RAY EXAM OF SHOULDER: CPT | Mod: LT

## 2025-04-18 PROCEDURE — 99212 OFFICE O/P EST SF 10 MIN: CPT | Mod: 25

## 2025-04-24 ENCOUNTER — APPOINTMENT (OUTPATIENT)
Dept: INFECTIOUS DISEASE | Facility: CLINIC | Age: 55
End: 2025-04-24

## 2025-04-25 ENCOUNTER — APPOINTMENT (OUTPATIENT)
Dept: ORTHOPEDIC SURGERY | Facility: CLINIC | Age: 55
End: 2025-04-25

## 2025-04-25 VITALS — WEIGHT: 170 LBS | HEIGHT: 66 IN | BODY MASS INDEX: 27.32 KG/M2

## 2025-04-25 DIAGNOSIS — Z86.718 PERSONAL HISTORY OF OTHER VENOUS THROMBOSIS AND EMBOLISM: ICD-10-CM

## 2025-04-25 PROCEDURE — 20611 DRAIN/INJ JOINT/BURSA W/US: CPT | Mod: LT

## 2025-04-28 ENCOUNTER — APPOINTMENT (OUTPATIENT)
Dept: ORTHOPEDIC SURGERY | Facility: CLINIC | Age: 55
End: 2025-04-28

## 2025-04-28 VITALS — BODY MASS INDEX: 27.32 KG/M2 | WEIGHT: 170 LBS | HEIGHT: 66 IN

## 2025-04-28 DIAGNOSIS — M25.512 PAIN IN LEFT SHOULDER: ICD-10-CM

## 2025-04-28 DIAGNOSIS — M75.112 INCOMPLETE ROTATOR CUFF TEAR OR RUPTURE OF LEFT SHOULDER, NOT SPECIFIED AS TRAUMATIC: ICD-10-CM

## 2025-04-28 DIAGNOSIS — M75.42 IMPINGEMENT SYNDROME OF LEFT SHOULDER: ICD-10-CM

## 2025-04-28 PROCEDURE — J3490M: CUSTOM

## 2025-04-28 PROCEDURE — 20611 DRAIN/INJ JOINT/BURSA W/US: CPT | Mod: LT

## 2025-05-02 ENCOUNTER — APPOINTMENT (OUTPATIENT)
Dept: PAIN MANAGEMENT | Facility: CLINIC | Age: 55
End: 2025-05-02
Payer: COMMERCIAL

## 2025-05-02 ENCOUNTER — APPOINTMENT (OUTPATIENT)
Dept: ORTHOPEDIC SURGERY | Facility: CLINIC | Age: 55
End: 2025-05-02

## 2025-05-02 DIAGNOSIS — M54.16 RADICULOPATHY, LUMBAR REGION: ICD-10-CM

## 2025-05-02 PROCEDURE — 20611 DRAIN/INJ JOINT/BURSA W/US: CPT | Mod: LT

## 2025-05-02 PROCEDURE — 99213 OFFICE O/P EST LOW 20 MIN: CPT | Mod: 95

## 2025-05-08 ENCOUNTER — APPOINTMENT (OUTPATIENT)
Dept: CARDIOLOGY | Facility: CLINIC | Age: 55
End: 2025-05-08
Payer: COMMERCIAL

## 2025-05-08 VITALS
HEART RATE: 54 BPM | HEIGHT: 66 IN | SYSTOLIC BLOOD PRESSURE: 160 MMHG | WEIGHT: 170 LBS | BODY MASS INDEX: 27.32 KG/M2 | DIASTOLIC BLOOD PRESSURE: 97 MMHG | OXYGEN SATURATION: 97 %

## 2025-05-08 DIAGNOSIS — I82.492 ACUTE EMBOLISM AND THROMBOSIS OF OTHER SPECIFIED DEEP VEIN OF LEFT LOWER EXTREMITY: ICD-10-CM

## 2025-05-08 PROCEDURE — 99214 OFFICE O/P EST MOD 30 MIN: CPT

## 2025-05-08 PROCEDURE — G2211 COMPLEX E/M VISIT ADD ON: CPT | Mod: NC

## 2025-05-09 ENCOUNTER — APPOINTMENT (OUTPATIENT)
Dept: ORTHOPEDIC SURGERY | Facility: CLINIC | Age: 55
End: 2025-05-09
Payer: COMMERCIAL

## 2025-05-09 VITALS — WEIGHT: 175 LBS | HEIGHT: 66 IN | BODY MASS INDEX: 28.12 KG/M2

## 2025-05-09 DIAGNOSIS — M17.12 UNILATERAL PRIMARY OSTEOARTHRITIS, LEFT KNEE: ICD-10-CM

## 2025-05-09 LAB
25(OH)D3 SERPL-MCNC: 19.9 NG/ML
ALBUMIN SERPL ELPH-MCNC: 4.2 G/DL
ALP BLD-CCNC: 90 U/L
ALT SERPL-CCNC: 44 U/L
ANION GAP SERPL CALC-SCNC: 15 MMOL/L
AST SERPL-CCNC: 32 U/L
BILIRUB SERPL-MCNC: 0.5 MG/DL
BUN SERPL-MCNC: 16 MG/DL
CALCIUM SERPL-MCNC: 9.4 MG/DL
CHLORIDE SERPL-SCNC: 103 MMOL/L
CHOLEST SERPL-MCNC: 261 MG/DL
CO2 SERPL-SCNC: 25 MMOL/L
CREAT SERPL-MCNC: 0.66 MG/DL
EGFRCR SERPLBLD CKD-EPI 2021: 104 ML/MIN/1.73M2
ESTIMATED AVERAGE GLUCOSE: 120 MG/DL
FERRITIN SERPL-MCNC: 34 NG/ML
GLUCOSE SERPL-MCNC: 98 MG/DL
HBA1C MFR BLD HPLC: 5.8 %
HCT VFR BLD CALC: 39.6 %
HDLC SERPL-MCNC: 98 MG/DL
HGB BLD-MCNC: 12.4 G/DL
IRON SATN MFR SERPL: 46 %
IRON SERPL-MCNC: 176 UG/DL
LDLC SERPL-MCNC: 146 MG/DL
MCHC RBC-ENTMCNC: 29 PG
MCHC RBC-ENTMCNC: 31.3 G/DL
MCV RBC AUTO: 92.5 FL
NONHDLC SERPL-MCNC: 163 MG/DL
PLATELET # BLD AUTO: 254 K/UL
POTASSIUM SERPL-SCNC: 4.5 MMOL/L
PROT SERPL-MCNC: 7.1 G/DL
RBC # BLD: 4.28 M/UL
RBC # FLD: 18 %
SODIUM SERPL-SCNC: 143 MMOL/L
TIBC SERPL-MCNC: 386 UG/DL
TRIGL SERPL-MCNC: 104 MG/DL
UIBC SERPL-MCNC: 210 UG/DL
WBC # FLD AUTO: 5.23 K/UL

## 2025-05-09 PROCEDURE — 99214 OFFICE O/P EST MOD 30 MIN: CPT

## 2025-05-09 RX ORDER — METHYLPREDNISOLONE 4 MG/1
4 TABLET ORAL
Qty: 1 | Refills: 0 | Status: ACTIVE | COMMUNITY
Start: 2025-05-09 | End: 1900-01-01

## 2025-05-15 ENCOUNTER — APPOINTMENT (OUTPATIENT)
Dept: ORTHOPEDIC SURGERY | Facility: CLINIC | Age: 55
End: 2025-05-15
Payer: OTHER MISCELLANEOUS

## 2025-05-15 DIAGNOSIS — M76.821 POSTERIOR TIBIAL TENDINITIS, RIGHT LEG: ICD-10-CM

## 2025-05-15 DIAGNOSIS — M76.822 POSTERIOR TIBIAL TENDINITIS, LEFT LEG: ICD-10-CM

## 2025-05-15 PROCEDURE — 73610 X-RAY EXAM OF ANKLE: CPT | Mod: RT

## 2025-05-15 PROCEDURE — 99203 OFFICE O/P NEW LOW 30 MIN: CPT

## 2025-05-15 PROCEDURE — 73620 X-RAY EXAM OF FOOT: CPT | Mod: RT

## 2025-05-16 ENCOUNTER — NON-APPOINTMENT (OUTPATIENT)
Age: 55
End: 2025-05-16

## 2025-05-20 ENCOUNTER — APPOINTMENT (OUTPATIENT)
Dept: CARDIOLOGY | Facility: CLINIC | Age: 55
End: 2025-05-20

## 2025-05-23 ENCOUNTER — APPOINTMENT (OUTPATIENT)
Dept: ORTHOPEDIC SURGERY | Facility: CLINIC | Age: 55
End: 2025-05-23
Payer: OTHER MISCELLANEOUS

## 2025-05-23 ENCOUNTER — NON-APPOINTMENT (OUTPATIENT)
Age: 55
End: 2025-05-23

## 2025-05-23 VITALS — BODY MASS INDEX: 28.77 KG/M2 | WEIGHT: 179 LBS | HEIGHT: 66 IN

## 2025-05-23 DIAGNOSIS — M75.42 IMPINGEMENT SYNDROME OF LEFT SHOULDER: ICD-10-CM

## 2025-05-23 DIAGNOSIS — M75.112 INCOMPLETE ROTATOR CUFF TEAR OR RUPTURE OF LEFT SHOULDER, NOT SPECIFIED AS TRAUMATIC: ICD-10-CM

## 2025-05-23 DIAGNOSIS — M25.512 PAIN IN LEFT SHOULDER: ICD-10-CM

## 2025-05-23 PROCEDURE — 99213 OFFICE O/P EST LOW 20 MIN: CPT

## 2025-05-25 ENCOUNTER — OUTPATIENT (OUTPATIENT)
Dept: OUTPATIENT SERVICES | Facility: HOSPITAL | Age: 55
LOS: 1 days | Discharge: HOME | End: 2025-05-25

## 2025-05-25 DIAGNOSIS — D64.9 ANEMIA, UNSPECIFIED: ICD-10-CM

## 2025-05-27 ENCOUNTER — APPOINTMENT (OUTPATIENT)
Dept: MRI IMAGING | Facility: CLINIC | Age: 55
End: 2025-05-27
Payer: COMMERCIAL

## 2025-05-27 ENCOUNTER — NON-APPOINTMENT (OUTPATIENT)
Age: 55
End: 2025-05-27

## 2025-05-27 PROCEDURE — 73721 MRI JNT OF LWR EXTRE W/O DYE: CPT | Mod: RT

## 2025-05-27 PROCEDURE — 73721 MRI JNT OF LWR EXTRE W/O DYE: CPT | Mod: LT

## 2025-05-29 ENCOUNTER — RESULT REVIEW (OUTPATIENT)
Age: 55
End: 2025-05-29

## 2025-05-29 ENCOUNTER — APPOINTMENT (OUTPATIENT)
Dept: HEMATOLOGY ONCOLOGY | Facility: CLINIC | Age: 55
End: 2025-05-29
Payer: COMMERCIAL

## 2025-05-29 VITALS
DIASTOLIC BLOOD PRESSURE: 89 MMHG | OXYGEN SATURATION: 99 % | WEIGHT: 180.34 LBS | HEART RATE: 67 BPM | TEMPERATURE: 98.2 F | SYSTOLIC BLOOD PRESSURE: 156 MMHG | RESPIRATION RATE: 16 BRPM | BODY MASS INDEX: 29.11 KG/M2

## 2025-05-29 DIAGNOSIS — I82.492 ACUTE EMBOLISM AND THROMBOSIS OF OTHER SPECIFIED DEEP VEIN OF LEFT LOWER EXTREMITY: ICD-10-CM

## 2025-05-29 DIAGNOSIS — I26.99 OTHER PULMONARY EMBOLISM W/OUT ACUTE COR PULMONALE: ICD-10-CM

## 2025-05-29 LAB
BASOPHILS # BLD AUTO: 0.02 K/UL — SIGNIFICANT CHANGE UP (ref 0–0.2)
BASOPHILS NFR BLD AUTO: 0.4 % — SIGNIFICANT CHANGE UP (ref 0–2)
EOSINOPHIL # BLD AUTO: 0.15 K/UL — SIGNIFICANT CHANGE UP (ref 0–0.5)
EOSINOPHIL NFR BLD AUTO: 3.3 % — SIGNIFICANT CHANGE UP (ref 0–6)
HCT VFR BLD CALC: 38.5 % — SIGNIFICANT CHANGE UP (ref 34.5–45)
HGB BLD-MCNC: 12.4 G/DL — SIGNIFICANT CHANGE UP (ref 11.5–15.5)
IMM GRANULOCYTES NFR BLD AUTO: 0.2 % — SIGNIFICANT CHANGE UP (ref 0–0.9)
LYMPHOCYTES # BLD AUTO: 1.27 K/UL — SIGNIFICANT CHANGE UP (ref 1–3.3)
LYMPHOCYTES # BLD AUTO: 27.7 % — SIGNIFICANT CHANGE UP (ref 13–44)
MCHC RBC-ENTMCNC: 30.7 PG — SIGNIFICANT CHANGE UP (ref 27–34)
MCHC RBC-ENTMCNC: 32.2 G/DL — SIGNIFICANT CHANGE UP (ref 32–36)
MCV RBC AUTO: 95.3 FL — SIGNIFICANT CHANGE UP (ref 80–100)
MONOCYTES # BLD AUTO: 0.4 K/UL — SIGNIFICANT CHANGE UP (ref 0–0.9)
MONOCYTES NFR BLD AUTO: 8.7 % — SIGNIFICANT CHANGE UP (ref 2–14)
NEUTROPHILS # BLD AUTO: 2.74 K/UL — SIGNIFICANT CHANGE UP (ref 1.8–7.4)
NEUTROPHILS NFR BLD AUTO: 59.7 % — SIGNIFICANT CHANGE UP (ref 43–77)
NRBC BLD AUTO-RTO: 0 /100 WBCS — SIGNIFICANT CHANGE UP (ref 0–0)
PLATELET # BLD AUTO: 204 K/UL — SIGNIFICANT CHANGE UP (ref 150–400)
RBC # BLD: 4.04 M/UL — SIGNIFICANT CHANGE UP (ref 3.8–5.2)
RBC # FLD: 17.5 % — HIGH (ref 10.3–14.5)
WBC # BLD: 4.59 K/UL — SIGNIFICANT CHANGE UP (ref 3.8–10.5)
WBC # FLD AUTO: 4.59 K/UL — SIGNIFICANT CHANGE UP (ref 3.8–10.5)

## 2025-05-29 PROCEDURE — G2211 COMPLEX E/M VISIT ADD ON: CPT | Mod: NC

## 2025-05-29 PROCEDURE — 99214 OFFICE O/P EST MOD 30 MIN: CPT

## 2025-05-30 LAB
ALBUMIN SERPL ELPH-MCNC: 4.1 G/DL
ALP BLD-CCNC: 88 U/L
ALT SERPL-CCNC: 51 U/L
ANION GAP SERPL CALC-SCNC: 13 MMOL/L
AST SERPL-CCNC: 41 U/L
BILIRUB SERPL-MCNC: 0.4 MG/DL
BUN SERPL-MCNC: 11 MG/DL
CALCIUM SERPL-MCNC: 9.4 MG/DL
CHLORIDE SERPL-SCNC: 100 MMOL/L
CO2 SERPL-SCNC: 26 MMOL/L
CREAT SERPL-MCNC: 0.72 MG/DL
EGFRCR SERPLBLD CKD-EPI 2021: 98 ML/MIN/1.73M2
FERRITIN SERPL-MCNC: 14 NG/ML
FOLATE SERPL-MCNC: >20 NG/ML
GLUCOSE SERPL-MCNC: 120 MG/DL
IRON SATN MFR SERPL: 17 %
IRON SERPL-MCNC: 58 UG/DL
POTASSIUM SERPL-SCNC: 4.8 MMOL/L
PROT SERPL-MCNC: 6.6 G/DL
SODIUM SERPL-SCNC: 139 MMOL/L
TIBC SERPL-MCNC: 333 UG/DL
UIBC SERPL-MCNC: 275 UG/DL
VIT B12 SERPL-MCNC: 247 PG/ML

## 2025-06-04 ENCOUNTER — APPOINTMENT (OUTPATIENT)
Dept: GASTROENTEROLOGY | Facility: CLINIC | Age: 55
End: 2025-06-04

## 2025-06-06 ENCOUNTER — APPOINTMENT (OUTPATIENT)
Dept: PAIN MANAGEMENT | Facility: CLINIC | Age: 55
End: 2025-06-06
Payer: COMMERCIAL

## 2025-06-06 PROCEDURE — 99213 OFFICE O/P EST LOW 20 MIN: CPT | Mod: 95

## 2025-06-19 ENCOUNTER — APPOINTMENT (OUTPATIENT)
Dept: CARDIOLOGY | Facility: CLINIC | Age: 55
End: 2025-06-19
Payer: COMMERCIAL

## 2025-06-19 PROCEDURE — 93306 TTE W/DOPPLER COMPLETE: CPT

## 2025-06-20 ENCOUNTER — APPOINTMENT (OUTPATIENT)
Dept: ORTHOPEDIC SURGERY | Facility: CLINIC | Age: 55
End: 2025-06-20
Payer: OTHER MISCELLANEOUS

## 2025-06-20 PROCEDURE — 99213 OFFICE O/P EST LOW 20 MIN: CPT

## 2025-06-23 ENCOUNTER — APPOINTMENT (OUTPATIENT)
Dept: ORTHOPEDIC SURGERY | Facility: CLINIC | Age: 55
End: 2025-06-23
Payer: COMMERCIAL

## 2025-06-23 VITALS — HEIGHT: 66 IN | WEIGHT: 180 LBS | BODY MASS INDEX: 28.93 KG/M2

## 2025-06-23 PROCEDURE — 99213 OFFICE O/P EST LOW 20 MIN: CPT

## 2025-06-24 ENCOUNTER — APPOINTMENT (OUTPATIENT)
Dept: ORTHOPEDIC SURGERY | Facility: CLINIC | Age: 55
End: 2025-06-24
Payer: COMMERCIAL

## 2025-06-24 PROCEDURE — 99213 OFFICE O/P EST LOW 20 MIN: CPT

## 2025-06-30 ENCOUNTER — APPOINTMENT (OUTPATIENT)
Dept: ORTHOPEDIC SURGERY | Facility: CLINIC | Age: 55
End: 2025-06-30

## 2025-06-30 PROBLEM — M19.032 ARTHRITIS OF WRIST, LEFT: Status: ACTIVE | Noted: 2025-06-30

## 2025-06-30 PROCEDURE — 99213 OFFICE O/P EST LOW 20 MIN: CPT

## 2025-07-02 ENCOUNTER — APPOINTMENT (OUTPATIENT)
Dept: PAIN MANAGEMENT | Facility: CLINIC | Age: 55
End: 2025-07-02
Payer: COMMERCIAL

## 2025-07-02 VITALS — HEIGHT: 66 IN | WEIGHT: 181 LBS | BODY MASS INDEX: 29.09 KG/M2

## 2025-07-02 PROCEDURE — 99213 OFFICE O/P EST LOW 20 MIN: CPT

## 2025-07-30 ENCOUNTER — APPOINTMENT (OUTPATIENT)
Dept: CARDIOLOGY | Facility: CLINIC | Age: 55
End: 2025-07-30
Payer: COMMERCIAL

## 2025-07-30 ENCOUNTER — APPOINTMENT (OUTPATIENT)
Dept: PAIN MANAGEMENT | Facility: CLINIC | Age: 55
End: 2025-07-30
Payer: COMMERCIAL

## 2025-07-30 VITALS
BODY MASS INDEX: 28.41 KG/M2 | SYSTOLIC BLOOD PRESSURE: 159 MMHG | DIASTOLIC BLOOD PRESSURE: 99 MMHG | WEIGHT: 176 LBS | OXYGEN SATURATION: 97 % | HEART RATE: 77 BPM

## 2025-07-30 DIAGNOSIS — I82.492 ACUTE EMBOLISM AND THROMBOSIS OF OTHER SPECIFIED DEEP VEIN OF LEFT LOWER EXTREMITY: ICD-10-CM

## 2025-07-30 DIAGNOSIS — I26.99 OTHER PULMONARY EMBOLISM W/OUT ACUTE COR PULMONALE: ICD-10-CM

## 2025-07-30 DIAGNOSIS — M54.16 RADICULOPATHY, LUMBAR REGION: ICD-10-CM

## 2025-07-30 PROCEDURE — 99213 OFFICE O/P EST LOW 20 MIN: CPT | Mod: 95

## 2025-07-30 PROCEDURE — G2211 COMPLEX E/M VISIT ADD ON: CPT | Mod: NC

## 2025-07-30 PROCEDURE — 99214 OFFICE O/P EST MOD 30 MIN: CPT

## 2025-08-07 ENCOUNTER — APPOINTMENT (OUTPATIENT)
Dept: ORTHOPEDIC SURGERY | Facility: CLINIC | Age: 55
End: 2025-08-07
Payer: OTHER MISCELLANEOUS

## 2025-08-07 VITALS — BODY MASS INDEX: 28.28 KG/M2 | WEIGHT: 176 LBS | HEIGHT: 66 IN

## 2025-08-07 DIAGNOSIS — M65.919 UNSP SYNOVITIS AND TENOSYNOVITIS, UNSPECIFIED SHOULDER: ICD-10-CM

## 2025-08-07 DIAGNOSIS — M75.42 IMPINGEMENT SYNDROME OF LEFT SHOULDER: ICD-10-CM

## 2025-08-07 DIAGNOSIS — M25.512 PAIN IN LEFT SHOULDER: ICD-10-CM

## 2025-08-07 DIAGNOSIS — M75.112 INCOMPLETE ROTATOR CUFF TEAR OR RUPTURE OF LEFT SHOULDER, NOT SPECIFIED AS TRAUMATIC: ICD-10-CM

## 2025-08-07 PROCEDURE — 99214 OFFICE O/P EST MOD 30 MIN: CPT

## 2025-08-22 ENCOUNTER — RX RENEWAL (OUTPATIENT)
Age: 55
End: 2025-08-22

## 2025-08-25 ENCOUNTER — APPOINTMENT (OUTPATIENT)
Dept: PAIN MANAGEMENT | Facility: CLINIC | Age: 55
End: 2025-08-25
Payer: COMMERCIAL

## 2025-08-25 VITALS — HEIGHT: 66 IN | WEIGHT: 175 LBS | BODY MASS INDEX: 28.12 KG/M2

## 2025-08-25 DIAGNOSIS — M96.1 POSTLAMINECTOMY SYNDROME, NOT ELSEWHERE CLASSIFIED: ICD-10-CM

## 2025-08-25 PROCEDURE — 99213 OFFICE O/P EST LOW 20 MIN: CPT | Mod: 3W

## 2025-09-09 ENCOUNTER — APPOINTMENT (OUTPATIENT)
Dept: ORTHOPEDIC SURGERY | Facility: CLINIC | Age: 55
End: 2025-09-09
Payer: COMMERCIAL

## 2025-09-09 DIAGNOSIS — M76.821 POSTERIOR TIBIAL TENDINITIS, RIGHT LEG: ICD-10-CM

## 2025-09-09 DIAGNOSIS — M76.822 POSTERIOR TIBIAL TENDINITIS, LEFT LEG: ICD-10-CM

## 2025-09-09 PROCEDURE — 99213 OFFICE O/P EST LOW 20 MIN: CPT

## 2025-09-11 ENCOUNTER — APPOINTMENT (OUTPATIENT)
Dept: CT IMAGING | Facility: CLINIC | Age: 55
End: 2025-09-11
Payer: COMMERCIAL

## 2025-09-11 ENCOUNTER — APPOINTMENT (OUTPATIENT)
Dept: ULTRASOUND IMAGING | Facility: CLINIC | Age: 55
End: 2025-09-11
Payer: COMMERCIAL

## 2025-09-11 PROCEDURE — 93970 EXTREMITY STUDY: CPT | Mod: 26

## 2025-09-11 PROCEDURE — 71275 CT ANGIOGRAPHY CHEST: CPT | Mod: 26

## 2025-09-12 ENCOUNTER — NON-APPOINTMENT (OUTPATIENT)
Age: 55
End: 2025-09-12

## 2025-09-18 ENCOUNTER — APPOINTMENT (OUTPATIENT)
Dept: ORTHOPEDIC SURGERY | Facility: CLINIC | Age: 55
End: 2025-09-18
Payer: OTHER MISCELLANEOUS

## 2025-09-18 DIAGNOSIS — M75.112 INCOMPLETE ROTATOR CUFF TEAR OR RUPTURE OF LEFT SHOULDER, NOT SPECIFIED AS TRAUMATIC: ICD-10-CM

## 2025-09-18 DIAGNOSIS — M25.512 PAIN IN LEFT SHOULDER: ICD-10-CM

## 2025-09-18 DIAGNOSIS — M75.42 IMPINGEMENT SYNDROME OF LEFT SHOULDER: ICD-10-CM

## 2025-09-18 PROCEDURE — 99213 OFFICE O/P EST LOW 20 MIN: CPT

## 2025-09-19 ENCOUNTER — APPOINTMENT (OUTPATIENT)
Dept: PAIN MANAGEMENT | Facility: CLINIC | Age: 55
End: 2025-09-19
Payer: COMMERCIAL

## 2025-09-19 DIAGNOSIS — M54.16 RADICULOPATHY, LUMBAR REGION: ICD-10-CM

## 2025-09-19 PROCEDURE — 99213 OFFICE O/P EST LOW 20 MIN: CPT | Mod: 3W
